# Patient Record
Sex: FEMALE | Employment: FULL TIME | URBAN - METROPOLITAN AREA
[De-identification: names, ages, dates, MRNs, and addresses within clinical notes are randomized per-mention and may not be internally consistent; named-entity substitution may affect disease eponyms.]

---

## 2017-02-28 ENCOUNTER — OFFICE VISIT (OUTPATIENT)
Dept: OBGYN | Facility: CLINIC | Age: 67
End: 2017-02-28
Payer: COMMERCIAL

## 2017-02-28 VITALS
DIASTOLIC BLOOD PRESSURE: 66 MMHG | BODY MASS INDEX: 21.35 KG/M2 | HEIGHT: 67 IN | WEIGHT: 136 LBS | SYSTOLIC BLOOD PRESSURE: 102 MMHG

## 2017-02-28 DIAGNOSIS — M81.0 MENOPAUSAL OSTEOPOROSIS: ICD-10-CM

## 2017-02-28 DIAGNOSIS — Z01.419 ENCOUNTER FOR GYNECOLOGICAL EXAMINATION WITHOUT ABNORMAL FINDING: Primary | ICD-10-CM

## 2017-02-28 PROCEDURE — G0101 CA SCREEN;PELVIC/BREAST EXAM: HCPCS | Performed by: OBSTETRICS & GYNECOLOGY

## 2017-02-28 ASSESSMENT — ANXIETY QUESTIONNAIRES
1. FEELING NERVOUS, ANXIOUS, OR ON EDGE: NOT AT ALL
IF YOU CHECKED OFF ANY PROBLEMS ON THIS QUESTIONNAIRE, HOW DIFFICULT HAVE THESE PROBLEMS MADE IT FOR YOU TO DO YOUR WORK, TAKE CARE OF THINGS AT HOME, OR GET ALONG WITH OTHER PEOPLE: NOT DIFFICULT AT ALL
6. BECOMING EASILY ANNOYED OR IRRITABLE: NOT AT ALL
7. FEELING AFRAID AS IF SOMETHING AWFUL MIGHT HAPPEN: NOT AT ALL
5. BEING SO RESTLESS THAT IT IS HARD TO SIT STILL: NOT AT ALL
2. NOT BEING ABLE TO STOP OR CONTROL WORRYING: NOT AT ALL
3. WORRYING TOO MUCH ABOUT DIFFERENT THINGS: NOT AT ALL
GAD7 TOTAL SCORE: 0

## 2017-02-28 ASSESSMENT — PATIENT HEALTH QUESTIONNAIRE - PHQ9: 5. POOR APPETITE OR OVEREATING: NOT AT ALL

## 2017-02-28 NOTE — PROGRESS NOTES
Candis is a 66 year old  female who presents for Medicare Limited exam.     Do you have a Health Care Directive?: No: Advance care planning was reviewed with patient; patient declined at this time.    Fall risk:   Fallen 2 or more times in the past year?: No  Any fall with injury in the past year?: No    HPI :  Andrae moved to [facility]. Seeing new MD at [facility].  No problems.   Bladder good.  On Fosamax drug holiday.    GYNECOLOGIC HISTORY:  No LMP recorded. Patient is postmenopausal..   reports that she has never smoked. She does not have any smokeless tobacco history on file.  STD testing offered?  Declined    Last PHQ-9 score on record=   PHQ-9 SCORE 2017   Total Score 0     Last GAD7 score on record=   ANDREW-7 SCORE 2016   Total Score 0 0       HEALTH MAINTENANCE:  Cholesterol: followed by primary care provider  Last Mammo: 17 at [facility], Result: normal, Next Mammo: in 1 year   Pap: N/A, over 65  DEXA: 4/20/15  Osteopenia, Spine -1.3, Hip -1.3 due this year  Colonoscopy:  , Result:  Normal, repeat 5 years due to family history, Next Colonoscopy:     HISTORY:  Obstetric History       T3      TAB0   SAB0   E0   M0   L3       # Outcome Date GA Lbr Pascual/2nd Weight Sex Delivery Anes PTL Lv   3 Term            2 Term            1 Term                 Past Medical History   Diagnosis Date     Basal cell carcinoma of skin      History of hormone replacement therapy      stopped      Menopausal osteoporosis 2015     Fosamax Holiday started      Stress incontinence, female      Urinary tract infection      Had 3 visits for UTI in . Last culture Pos for Proteus.     Past Surgical History   Procedure Laterality Date     No history of surgery       Family History   Problem Relation Age of Onset     Colon Cancer Father 60     OSTEOPOROSIS Sister      Social History     Social History     Marital status:      Spouse name: N/A      "Number of children: 3     Years of education: N/A     Social History Main Topics     Smoking status: Never Smoker     Smokeless tobacco: Not on file     Alcohol use 0.0 oz/week     0 Standard drinks or equivalent per week     Drug use: Not on file     Sexual activity: Yes     Partners: Male     Birth control/ protection: Post-menopausal     Other Topics Concern     Not on file     Social History Narrative    [facility]     Current Outpatient Prescriptions   Medication Sig     valACYclovir (VALTREX) 1000 mg tablet as needed      CALCIUM PO      GLUCOSAMINE-CHONDROITIN PO      MULTIPLE VITAMIN PO      No current facility-administered medications for this visit.      No Known Allergies    Past medical, surgical, social and family history were reviewed and updated in EPIC.    EXAM:  /66  Ht 5' 7.25\" (1.708 m)  Wt 136 lb (61.7 kg)  BMI 21.14 kg/m2   BMI: Body mass index is 21.14 kg/(m^2).    Constitutional: Appearance: Well nourished, well developed alert, in no acute distress  Breasts: Inspection of Breasts:  No lymphadenopathy present    Palpation of Breasts and Axillae:  No masses present on palpation, no  breast tenderness    Axillary Lymph Nodes:  No lymphadenopathy present  Neurologic/Psychiatric:    Mental Status:  Oriented X3     Pelvic Exam:  External Genitalia:     Normal appearance for age, no discharge present, no tenderness present, no inflammatory lesions present, color normal  Vagina:     Normal vaginal vault without central or paravaginal defects, ATROPHIC  Bladder:     Nontender to palpation  Urethra:   Urethral Body:  Urethra palpation normal, urethra structural support normal   Urethral Meatus:  No erythema or lesions present  Cervix:     Appearance healthy, no lesions present, nontender to palpation, no bleeding present  Uterus:     Nontender to palpation, no masses present, position anteflexed, mobility: normal  Adnexa:     No adnexal tenderness present, no adnexal masses " present  Perineum:     Perineum within normal limits, no evidence of trauma, no rashes or skin lesions present  Inguinal Lymph Nodes:     No lymphadenopathy present      Body mass index is 21.14 kg/(m^2).     reports that she has never smoked. She does not have any smokeless tobacco history on file.      ASSESSMENT:  66 year old female with satisfactory annual exam.    ICD-10-CM    1. Encounter for gynecological examination without abnormal finding Z01.419 Medicare Limited Visit   2. Menopausal osteoporosis- Fosamax Drug Holiday M81.0 DX Hip/Pelvis/Spine       COUNSELING:   Reviewed preventive health counseling, as reflected in patient instructions       Regular exercise    PLAN/PATIENT INSTRUCTIONS:  DEXA today.    Justin Kyle MD

## 2017-02-28 NOTE — MR AVS SNAPSHOT
"              After Visit Summary   2/28/2017    Candis Lino    MRN: [id number]           Patient Information     Date Of Birth          1950        Visit Information        Provider Department      2/28/2017 8:30 AM Justin Kyle MD [facility]        Today's Diagnoses     Encounter for gynecological examination without abnormal finding    -  1    Menopausal osteoporosis- Fosamax Drug Holiday           Follow-ups after your visit        Future tests that were ordered for you today     Open Future Orders        Priority Expected Expires Ordered    DX Hip/Pelvis/Spine Routine  2/28/2018 2/28/2017            Who to contact     If you have questions or need follow up information about today's clinic visit or your schedule please contact [facility] directly at [phone].  Normal or non-critical lab and imaging results will be communicated to you by MyChart, letter or phone within 4 business days after the clinic has received the results. If you do not hear from us within 7 days, please contact the clinic through MyChart or phone. If you have a critical or abnormal lab result, we will notify you by phone as soon as possible.  Submit refill requests through R[employer] or call your pharmacy and they will forward the refill request to us. Please allow 3 business days for your refill to be completed.          Additional Information About Your Visit        MyChart Information     R[employer] lets you send messages to your doctor, view your test results, renew your prescriptions, schedule appointments and more. To sign up, go to [email or web address] . Click on \"Log in\" on the left side of the screen, which will take you to the Welcome page. Then click on \"Sign up Now\" on the right side of the page.     You will be asked to enter the access code listed below, as well as some personal information. Please follow the directions to create your username and password.   " "  Your access code is: [email or web address]  Expires: 2017  8:51 AM     Your access code will  in 90 days. If you need help or a new code, please call your [facility] or [phone].        Care EveryWhere ID     This is your Care EveryWhere ID. This could be used by other organizations to access your [place] medical records  [id number]        Your Vitals Were     Height BMI (Body Mass Index)                5' 7.25\" (1.708 m) 21.14 kg/m2           Blood Pressure from Last 3 Encounters:   17 102/66   16 100/70   02/13/15 112/62    Weight from Last 3 Encounters:   17 136 lb (61.7 kg)   16 134 lb (60.8 kg)   02/13/15 137 lb (62.1 kg)              We Performed the Following     Medicare Limited Visit        Primary Care Provider Office Phone # Fax #    [facility] [phone]       [address] [place] [address]        Thank you!     Thank you for choosing [facility]  for your care. Our goal is always to provide you with excellent care. Hearing back from our patients is one way we can continue to improve our services. Please take a few minutes to complete the written survey that you may receive in the mail after your visit with us. Thank you!             Your Updated Medication List - Protect others around you: Learn how to safely use, store and throw away your medicines at www.disposemymeds.org.          This list is accurate as of: 17  8:58 AM.  Always use your most recent med list.                   Brand Name Dispense Instructions for use    CALCIUM PO          GLUCOSAMINE-CHONDROITIN PO          MULTIPLE VITAMIN PO          valACYclovir 1000 mg tablet    VALTREX    21 tablet    as needed         "

## 2017-03-01 ASSESSMENT — PATIENT HEALTH QUESTIONNAIRE - PHQ9: SUM OF ALL RESPONSES TO PHQ QUESTIONS 1-9: 0

## 2017-03-01 ASSESSMENT — ANXIETY QUESTIONNAIRES: GAD7 TOTAL SCORE: 0

## 2018-02-16 ENCOUNTER — TRANSFERRED RECORDS (OUTPATIENT)
Dept: HEALTH INFORMATION MANAGEMENT | Facility: CLINIC | Age: 68
End: 2018-02-16

## 2018-03-08 ENCOUNTER — OFFICE VISIT (OUTPATIENT)
Dept: OBGYN | Facility: CLINIC | Age: 68
End: 2018-03-08
Payer: COMMERCIAL

## 2018-03-08 ENCOUNTER — RADIANT APPOINTMENT (OUTPATIENT)
Dept: BONE DENSITY | Facility: CLINIC | Age: 68
End: 2018-03-08
Payer: COMMERCIAL

## 2018-03-08 VITALS
SYSTOLIC BLOOD PRESSURE: 106 MMHG | DIASTOLIC BLOOD PRESSURE: 58 MMHG | WEIGHT: 138 LBS | HEART RATE: 64 BPM | HEIGHT: 67 IN | BODY MASS INDEX: 21.66 KG/M2

## 2018-03-08 DIAGNOSIS — M81.0 MENOPAUSAL OSTEOPOROSIS: ICD-10-CM

## 2018-03-08 DIAGNOSIS — Z01.419 ENCOUNTER FOR GYNECOLOGICAL EXAMINATION WITHOUT ABNORMAL FINDING: Primary | ICD-10-CM

## 2018-03-08 DIAGNOSIS — Z78.0 ASYMPTOMATIC POSTMENOPAUSAL STATE: ICD-10-CM

## 2018-03-08 PROCEDURE — 77080 DXA BONE DENSITY AXIAL: CPT | Performed by: OBSTETRICS & GYNECOLOGY

## 2018-03-08 PROCEDURE — G0101 CA SCREEN;PELVIC/BREAST EXAM: HCPCS | Performed by: OBSTETRICS & GYNECOLOGY

## 2018-03-08 ASSESSMENT — PATIENT HEALTH QUESTIONNAIRE - PHQ9: 5. POOR APPETITE OR OVEREATING: NOT AT ALL

## 2018-03-08 ASSESSMENT — ANXIETY QUESTIONNAIRES
6. BECOMING EASILY ANNOYED OR IRRITABLE: NOT AT ALL
1. FEELING NERVOUS, ANXIOUS, OR ON EDGE: NOT AT ALL
GAD7 TOTAL SCORE: 0
2. NOT BEING ABLE TO STOP OR CONTROL WORRYING: NOT AT ALL
5. BEING SO RESTLESS THAT IT IS HARD TO SIT STILL: NOT AT ALL
7. FEELING AFRAID AS IF SOMETHING AWFUL MIGHT HAPPEN: NOT AT ALL
IF YOU CHECKED OFF ANY PROBLEMS ON THIS QUESTIONNAIRE, HOW DIFFICULT HAVE THESE PROBLEMS MADE IT FOR YOU TO DO YOUR WORK, TAKE CARE OF THINGS AT HOME, OR GET ALONG WITH OTHER PEOPLE: NOT DIFFICULT AT ALL
3. WORRYING TOO MUCH ABOUT DIFFERENT THINGS: NOT AT ALL

## 2018-03-08 NOTE — MR AVS SNAPSHOT
"              After Visit Summary   3/8/2018    Candis Lino    MRN: [id number]           Patient Information     Date Of Birth          1950        Visit Information        Provider Department      3/8/2018 8:00 AM Justin Kyle MD [facility]        Today's Diagnoses     Encounter for gynecological examination without abnormal finding    -  1    Menopausal osteoporosis           Follow-ups after your visit        Who to contact     If you have questions or need follow up information about today's clinic visit or your schedule please contact [facility] directly at [phone].  Normal or non-critical lab and imaging results will be communicated to you by N12 Technologieshart, letter or phone within 4 business days after the clinic has received the results. If you do not hear from us within 7 days, please contact the clinic through [employer] or phone. If you have a critical or abnormal lab result, we will notify you by phone as soon as possible.  Submit refill requests through QR[employer] or call your pharmacy and they will forward the refill request to us. Please allow 3 business days for your refill to be completed.          Additional Information About Your Visit        MyChart Information     QRuso lets you send messages to your doctor, view your test results, renew your prescriptions, schedule appointments and more. To sign up, go to [email or web address] . Click on \"Log in\" on the left side of the screen, which will take you to the Welcome page. Then click on \"Sign up Now\" on the right side of the page.     You will be asked to enter the access code listed below, as well as some personal information. Please follow the directions to create your username and password.     Your access code is: [email or web address]  Expires: 2018  8:30 AM     Your access code will  in 90 days. If you need help or a new code, please call your [facility] or [phone].      " "  Care EveryWhere ID     This is your Care EveryWhere ID. This could be used by other organizations to access your [place] medical records  [id number]        Your Vitals Were     Pulse Height Breastfeeding? BMI (Body Mass Index)          64 5' 7.25\" (1.708 m) No 21.45 kg/m2         Blood Pressure from Last 3 Encounters:   03/08/18 106/58   02/28/17 102/66   02/18/16 100/70    Weight from Last 3 Encounters:   03/08/18 138 lb (62.6 kg)   02/28/17 136 lb (61.7 kg)   02/18/16 134 lb (60.8 kg)              We Performed the Following     Medicare Limited Visit        Primary Care Provider Office Phone # Fax #    [facility] [phone]       [address] [place] [address]        Equal Access to Services     JESSY SARAVIA : Hadii brenda ghosh hadasho Soomaali, waaxda luqadaha, qaybta kaalmada adeegyada, waldo jiménez hayshiva guerra . So [facility] [phone].    ATENCIÓN: Si habla español, tiene a dillon disposición servicios gratuitos de asistencia lingüística. Rahul al [phone].    We comply with applicable federal civil rights laws and [place] laws. We do not discriminate on the basis of race, color, national origin, age, disability, sex, sexual orientation, or gender identity.            Thank you!     Thank you for choosing [facility]  for your care. Our goal is always to provide you with excellent care. Hearing back from our patients is one way we can continue to improve our services. Please take a few minutes to complete the written survey that you may receive in the mail after your visit with us. Thank you!             Your Updated Medication List - Protect others around you: Learn how to safely use, store and throw away your medicines at www.disposemymeds.org.          This list is accurate as of 3/8/18  8:30 AM.  Always use your most recent med list.                   Brand Name Dispense Instructions for use Diagnosis    CALCIUM PO           " GLUCOSAMINE-CHONDROITIN PO           MULTIPLE VITAMIN PO           valACYclovir 1000 mg tablet    VALTREX    21 tablet    as needed

## 2018-03-08 NOTE — Clinical Note
Please abstract the following data from this visit with this patient into the appropriate field in Epic:  Mammogram done on this date: 2/16/2018 (approximately), by this group: [facility], results were Normal.

## 2018-03-08 NOTE — PROGRESS NOTES
Candis is a 67 year old  female who presents for Medicare Limited exam.     Do you have a Health Care Directive?: No: Advance care planning was reviewed with patient; patient declined at this time.    Fall risk:   Fallen 2 or more times in the past year?: No  Any fall with injury in the past year?: No    HPI :  Doing well. Active. Cross country skiing.  Bladder good.  DEXA today. Last one with osteopenia. On Fosamax drug holiday.  UTD on vaccinations.     GYNECOLOGIC HISTORY:  No LMP recorded. Patient is postmenopausal..   reports that she has never smoked. She has never used smokeless tobacco.    STD testing offered?  Declined  Last PHQ-9 score on record=   PHQ-9 SCORE 3/8/2018   Total Score 0     Last GAD7 score on record=   ANDREW-7 SCORE 2016 2017 3/8/2018   Total Score 0 0 0       HEALTH MAINTENANCE:  Cholesterol: about 2 weeks with PCP  Last Mammo: 2 weeks ago with PCP, Result: normal, Next Mammo: 1 year   Pap: 2013 WNL HPV-  DEXA:  2015 Hip -0.9, Spine -1.3  Colonoscopy:  , Result:  normal, Next Colonoscopy: due this year.    HISTORY:  Obstetric History       T3      L3     SAB0   TAB0   Ectopic0   Multiple0   Live Births0       # Outcome Date GA Lbr Pascual/2nd Weight Sex Delivery Anes PTL Lv   3 Term            2 Term            1 Term                 Past Medical History:   Diagnosis Date     Basal cell carcinoma of skin      History of hormone replacement therapy     stopped      Menopausal osteoporosis 2015    Fosamax Holiday started      Stress incontinence, female      Urinary tract infection     Had 3 visits for UTI in . Last culture Pos for Proteus.     Past Surgical History:   Procedure Laterality Date     NO HISTORY OF SURGERY       Family History   Problem Relation Age of Onset     Colon Cancer Father 60     OSTEOPOROSIS Sister      Social History     Social History     Marital status:      Spouse name: N/A     Number of children:  "3     Years of education: N/A     Social History Main Topics     Smoking status: Never Smoker     Smokeless tobacco: Not on file     Alcohol use 0.0 oz/week     0 Standard drinks or equivalent per week     Drug use: Not on file     Sexual activity: Yes     Partners: Male     Birth control/ protection: Post-menopausal     Other Topics Concern     Not on file     Social History Narrative    [facility]     Current Outpatient Prescriptions   Medication Sig     valACYclovir (VALTREX) 1000 mg tablet as needed      CALCIUM PO      GLUCOSAMINE-CHONDROITIN PO      MULTIPLE VITAMIN PO      No current facility-administered medications for this visit.      No Known Allergies    Past medical, surgical, social and family history were reviewed and updated in [facility].    EXAM:  /58  Pulse 64  Ht 5' 7.25\" (1.708 m)  Wt 138 lb (62.6 kg)  Breastfeeding? No  BMI 21.45 kg/m2   BMI: Body mass index is 21.45 kg/(m^2).    Constitutional: Appearance: Well nourished, well developed alert, in no acute distress  Breasts: Inspection of Breasts:  No lymphadenopathy present    Palpation of Breasts and Axillae:  No masses present on palpation, no  breast tenderness    Axillary Lymph Nodes:  No lymphadenopathy present  Neurologic/Psychiatric:    Mental Status:  Oriented X3     Pelvic Exam:  External Genitalia:     Normal appearance for age, no discharge present, no tenderness present, no inflammatory lesions present, color normal  Vagina:     Normal vaginal vault without central or paravaginal defects, no discharge present, no inflammatory lesions present, no masses present  Bladder:     Nontender to palpation  Urethra:   Urethral Body:  Urethra palpation normal, urethra structural support normal   Urethral Meatus:  No erythema or lesions present  Cervix:     Appearance healthy, no lesions present, nontender to palpation, no bleeding present  Uterus:     Uterus: firm, normal sized and nontender, anteverted in position. "   Adnexa:     No adnexal tenderness present, no adnexal masses present  Perineum:     Perineum within normal limits, no evidence of trauma, no rashes or skin lesions present  Anus:     Anus within normal limits, no hemorrhoids present  Inguinal Lymph Nodes:     No lymphadenopathy present  Pubic Hair:     Normal pubic hair distribution for age  Genitalia and Groin:     No rashes present, no lesions present, no areas of discoloration, no masses present      Body mass index is 21.45 kg/(m^2).  Weight management plan noted, stable and monitoring   reports that she has never smoked. She has never used smokeless tobacco.      ASSESSMENT:  67 year old female with satisfactory annual exam.    ICD-10-CM    1. Encounter for gynecological examination without abnormal finding Z01.419 Medicare Limited Visit   2. Menopausal osteoporosis M81.0        COUNSELING:   Reviewed preventive health counseling, as reflected in patient instructions       Regular exercise    PLAN/PATIENT INSTRUCTIONS:  DEXA today. If stable repeat in 3-4 years.  Discussed new Shingles vaccine.   Colonoscopy this year.    Justin Kyle MD

## 2018-03-08 NOTE — PROGRESS NOTES
No major changes in results. 2 vertebrae might be worse but the test is on a different machine so direct comparison can't be made. Plan to repeat in 2 years.

## 2018-03-09 ASSESSMENT — PATIENT HEALTH QUESTIONNAIRE - PHQ9: SUM OF ALL RESPONSES TO PHQ QUESTIONS 1-9: 0

## 2018-03-09 ASSESSMENT — ANXIETY QUESTIONNAIRES: GAD7 TOTAL SCORE: 0

## 2018-05-16 ENCOUNTER — OFFICE VISIT (OUTPATIENT)
Dept: OBGYN | Facility: CLINIC | Age: 68
End: 2018-05-16
Payer: COMMERCIAL

## 2018-05-16 VITALS
SYSTOLIC BLOOD PRESSURE: 114 MMHG | BODY MASS INDEX: 21.72 KG/M2 | WEIGHT: 138.4 LBS | DIASTOLIC BLOOD PRESSURE: 76 MMHG | HEIGHT: 67 IN | TEMPERATURE: 97.6 F

## 2018-05-16 DIAGNOSIS — R39.9 UTI SYMPTOMS: Primary | ICD-10-CM

## 2018-05-16 LAB
ALBUMIN UR-MCNC: NEGATIVE MG/DL
APPEARANCE UR: ABNORMAL
BILIRUB UR QL STRIP: NEGATIVE
COLOR UR AUTO: YELLOW
GLUCOSE UR STRIP-MCNC: NEGATIVE MG/DL
HGB UR QL STRIP: ABNORMAL
KETONES UR STRIP-MCNC: NEGATIVE MG/DL
LEUKOCYTE ESTERASE UR QL STRIP: ABNORMAL
NITRATE UR QL: NEGATIVE
PH UR STRIP: 5.5 PH (ref 5–7)
SOURCE: ABNORMAL
SP GR UR STRIP: <=1.005 (ref 1–1.03)
UROBILINOGEN UR STRIP-ACNC: 0.2 EU/DL (ref 0.2–1)

## 2018-05-16 PROCEDURE — 87186 SC STD MICRODIL/AGAR DIL: CPT | Performed by: NURSE PRACTITIONER

## 2018-05-16 PROCEDURE — 87086 URINE CULTURE/COLONY COUNT: CPT | Performed by: NURSE PRACTITIONER

## 2018-05-16 PROCEDURE — 87088 URINE BACTERIA CULTURE: CPT | Performed by: NURSE PRACTITIONER

## 2018-05-16 PROCEDURE — 99213 OFFICE O/P EST LOW 20 MIN: CPT | Performed by: NURSE PRACTITIONER

## 2018-05-16 PROCEDURE — 81003 URINALYSIS AUTO W/O SCOPE: CPT | Performed by: NURSE PRACTITIONER

## 2018-05-16 RX ORDER — CIPROFLOXACIN 500 MG/1
500 TABLET, FILM COATED ORAL 2 TIMES DAILY
Qty: 14 TABLET | Refills: 0 | Status: SHIPPED | OUTPATIENT
Start: 2018-05-16 | End: 2019-03-14

## 2018-05-16 NOTE — MR AVS SNAPSHOT
"              After Visit Summary   2018    Candis Lino    MRN: [id number]           Patient Information     Date Of Birth          1950        Visit Information        Provider Department      2018 1:00 PM Jacquelyn Carcamo APRN CNP [facility]        Today's Diagnoses     UTI symptoms    -  1       Follow-ups after your visit        Who to contact     If you have questions or need follow up information about today's clinic visit or your schedule please contact [facility] directly at [phone].  Normal or non-critical lab and imaging results will be communicated to you by Baby[employer], letter or phone within 4 business days after the clinic has received the results. If you do not hear from us within 7 days, please contact the clinic through [employer] or phone. If you have a critical or abnormal lab result, we will notify you by phone as soon as possible.  Submit refill requests through [employer] or call your pharmacy and they will forward the refill request to us. Please allow 3 business days for your refill to be completed.          Additional Information About Your Visit        MyChart Information     [employer] lets you send messages to your doctor, view your test results, renew your prescriptions, schedule appointments and more. To sign up, go to [email or web address] . Click on \"Log in\" on the left side of the screen, which will take you to the Welcome page. Then click on \"Sign up Now\" on the right side of the page.     You will be asked to enter the access code listed below, as well as some personal information. Please follow the directions to create your username and password.     Your access code is: [email or web address]  Expires: 2018  9:30 AM     Your access code will  in 90 days. If you need help or a new code, please call your [place] clinic or [phone].        Care EveryWhere ID     This is your Care EveryWhere ID. This could be used by " "other organizations to access your [place] medical records  [id number]        Your Vitals Were     Temperature Height BMI (Body Mass Index)             97.6  F (36.4  C) 5' 7.25\" (1.708 m) 21.52 kg/m2          Blood Pressure from Last 3 Encounters:   05/16/18 114/76   03/08/18 106/58   02/28/17 102/66    Weight from Last 3 Encounters:   05/16/18 138 lb 6.4 oz (62.8 kg)   03/08/18 138 lb (62.6 kg)   02/28/17 136 lb (61.7 kg)              We Performed the Following     UA without Microscopic     Urine Culture Aerobic Bacterial          Today's Medication Changes          These changes are accurate as of 5/16/18  1:29 PM.  If you have any questions, ask your nurse or doctor.               Start taking these medicines.        Dose/Directions    ciprofloxacin 500 MG tablet   Commonly known as:  CIPRO   Used for:  UTI symptoms        Dose:  500 mg   Take 1 tablet (500 mg) by mouth 2 times daily   Quantity:  14 tablet   Refills:  0            Where to get your medicines      These medications were sent to [facility] [address] [place] [address] AT [facility] OF  & [address] [address]     Phone:  [phone]     ciprofloxacin 500 MG tablet                Primary Care Provider Office Phone # Fax #    [facility] [phone]       [address] [place] [address]        Equal Access to Services     JESSY SARAVIA AH: Hadii brenda arriolao Sokeiko, waaxda luqadaha, qaybta kaalmada nasreenyada, wax tino vanegas. So [facility] [phone].    ATENCIÓN: Si habla español, tiene a dillon disposición servicios gratuitos de asistencia lingüística. Llame al [phone].    We comply with applicable federal civil rights laws and [place] laws. We do not discriminate on the basis of race, color, national origin, age, disability, sex, sexual orientation, or gender identity.            Thank you!     Thank you for " choosing [facility]  for your care. Our goal is always to provide you with excellent care. Hearing back from our patients is one way we can continue to improve our services. Please take a few minutes to complete the written survey that you may receive in the mail after your visit with us. Thank you!             Your Updated Medication List - Protect others around you: Learn how to safely use, store and throw away your medicines at www.disposemymeds.org.          This list is accurate as of 5/16/18  1:29 PM.  Always use your most recent med list.                   Brand Name Dispense Instructions for use Diagnosis    CALCIUM PO           ciprofloxacin 500 MG tablet    CIPRO    14 tablet    Take 1 tablet (500 mg) by mouth 2 times daily    UTI symptoms       GLUCOSAMINE-CHONDROITIN PO           MULTIPLE VITAMIN PO           valACYclovir 1000 mg tablet    VALTREX    21 tablet    as needed

## 2018-05-16 NOTE — PROGRESS NOTES
SUBJECTIVE:                                                   Candis Lino is a 68 year old female who presents to clinic today for the following health issue(s):  Patient presents with:  UTI: has urgency and cloudy urine, pain at end of stream.      Additional information: was treated at urgent care on 18 with cephalexin 5 days.    HPI:  Pt here today with ongoing symptoms of a UTI. She was treated with keflex for 5 days.     Denies back pain, pelvic pressure. She does have some burning at the end of the stream.     No fever/chills.    She does work out daily and wears a pad for urinary leakage. She tries to change the pads when they are soiled.    No LMP recorded. Patient is postmenopausal..   Patient is sexually active, .  Using menopause for contraception.    reports that she has never smoked. She has never used smokeless tobacco.    STD testing offered?  Declined    Health maintenance updated:  yes    Today's PHQ-2 Score:   PHQ-2 (  [employer]) 2018   Q1: Little interest or pleasure in doing things 0   Q2: Feeling down, depressed or hopeless 0   PHQ-2 Score 0     Today's PHQ-9 Score:   PHQ-9 SCORE 3/8/2018   Total Score 0     Today's ANDREW-7 Score:   ANDREW-7 SCORE 3/8/2018   Total Score 0       Problem list and histories reviewed & adjusted, as indicated.  Additional history: as documented.    Patient Active Problem List   Diagnosis     Menopausal osteoporosis     Past Surgical History:   Procedure Laterality Date     NO HISTORY OF SURGERY        Social History   Substance Use Topics     Smoking status: Never Smoker     Smokeless tobacco: Never Used     Alcohol use 0.0 oz/week     0 Standard drinks or equivalent per week      Problem (# of Occurrences) Relation (Name,Age of Onset)    Colon Cancer (1) Father (60)    OSTEOPOROSIS (1) Sister            Current Outpatient Prescriptions   Medication Sig     ciprofloxacin (CIPRO) 500 MG tablet Take 1 tablet (500 mg) by mouth 2 times daily      "CALCIUM PO      GLUCOSAMINE-CHONDROITIN PO      MULTIPLE VITAMIN PO      valACYclovir (VALTREX) 1000 mg tablet as needed      No current facility-administered medications for this visit.      No Known Allergies    ROS:  12 point review of systems negative other than symptoms noted below.  Genitourinary: Urgency    OBJECTIVE:     /76  Temp 97.6  F (36.4  C)  Ht 5' 7.25\" (1.708 m)  Wt 138 lb 6.4 oz (62.8 kg)  BMI 21.52 kg/m2  Body mass index is 21.52 kg/(m^2).    Exam:  Constitutional:  Appearance: Well nourished, well developed alert, in no acute distress  Neurologic/Psychiatric:  Mental Status:  Oriented X3   No Pelvic Exam performed     In-Clinic Test Results:  Results for orders placed or performed in visit on 05/16/18 (from the past 24 hour(s))   UA without Microscopic   Result Value Ref Range    Color Urine Yellow     Appearance Urine Cloudy     Glucose Urine Negative NEG^Negative mg/dL    Bilirubin Urine Negative NEG^Negative    Ketones Urine Negative NEG^Negative mg/dL    Specific Gravity Urine <=1.005 1.003 - 1.035    Blood Urine 3+ (A) NEG^Negative    pH Urine 5.5 5.0 - 7.0 pH    Protein Albumin Urine Negative NEG^Negative mg/dL    Urobilinogen Urine 0.2 0.2 - 1.0 EU/dL    Nitrite Urine Negative NEG^Negative    Leukocyte Esterase Urine 3+ (A) NEG^Negative    Source Midstream Urine        ASSESSMENT/PLAN:                                                        ICD-10-CM    1. UTI symptoms R39.9 UA without Microscopic     ciprofloxacin (CIPRO) 500 MG tablet     Urine Culture Aerobic Bacterial       There are no Patient Instructions on file for this visit.    67 yo with peristent UTI symptoms. Her UA is actually worse since taking keflex. Will send UC. UC reviewed from urgent care. Will try cipro. Will update with UC on Friday.     EDVIN [facility]ancey [facility]  "

## 2018-05-18 ENCOUNTER — TELEPHONE (OUTPATIENT)
Dept: OBGYN | Facility: CLINIC | Age: 68
End: 2018-05-18

## 2018-05-18 LAB
BACTERIA SPEC CULT: ABNORMAL
BACTERIA SPEC CULT: ABNORMAL
Lab: ABNORMAL
SPECIMEN SOURCE: ABNORMAL

## 2018-08-27 ENCOUNTER — TELEPHONE (OUTPATIENT)
Dept: OBGYN | Facility: CLINIC | Age: 68
End: 2018-08-27

## 2018-08-27 NOTE — LETTER
[facility] FOR [address] [address]  [phone]        August 27, 2018    Candis Lino  [address] [address]              Dear Candis Lino    This is to remind you that your Urinalysis and Urine Culture is due.    You may call our office at [phone] to schedule an appointment.          Sincerely,        Jacquelyn Carcamo RN, CNP

## 2019-02-21 ENCOUNTER — TRANSFERRED RECORDS (OUTPATIENT)
Dept: HEALTH INFORMATION MANAGEMENT | Facility: CLINIC | Age: 69
End: 2019-02-21

## 2019-03-14 ENCOUNTER — OFFICE VISIT (OUTPATIENT)
Dept: OBGYN | Facility: CLINIC | Age: 69
End: 2019-03-14
Payer: COMMERCIAL

## 2019-03-14 VITALS
BODY MASS INDEX: 21.63 KG/M2 | DIASTOLIC BLOOD PRESSURE: 62 MMHG | WEIGHT: 137.8 LBS | HEART RATE: 60 BPM | HEIGHT: 67 IN | SYSTOLIC BLOOD PRESSURE: 100 MMHG

## 2019-03-14 DIAGNOSIS — M81.0 MENOPAUSAL OSTEOPOROSIS: ICD-10-CM

## 2019-03-14 DIAGNOSIS — N39.3 STRESS INCONTINENCE IN FEMALE: ICD-10-CM

## 2019-03-14 DIAGNOSIS — Z01.419 ENCOUNTER FOR GYNECOLOGICAL EXAMINATION WITHOUT ABNORMAL FINDING: Primary | ICD-10-CM

## 2019-03-14 PROCEDURE — 99213 OFFICE O/P EST LOW 20 MIN: CPT | Performed by: OBSTETRICS & GYNECOLOGY

## 2019-03-14 RX ORDER — ATORVASTATIN CALCIUM 40 MG/1
40 TABLET, FILM COATED ORAL
COMMUNITY
Start: 2019-02-18 | End: 2020-06-04

## 2019-03-14 ASSESSMENT — ANXIETY QUESTIONNAIRES
GAD7 TOTAL SCORE: 0
7. FEELING AFRAID AS IF SOMETHING AWFUL MIGHT HAPPEN: NOT AT ALL
3. WORRYING TOO MUCH ABOUT DIFFERENT THINGS: NOT AT ALL
6. BECOMING EASILY ANNOYED OR IRRITABLE: NOT AT ALL
2. NOT BEING ABLE TO STOP OR CONTROL WORRYING: NOT AT ALL
1. FEELING NERVOUS, ANXIOUS, OR ON EDGE: NOT AT ALL
5. BEING SO RESTLESS THAT IT IS HARD TO SIT STILL: NOT AT ALL

## 2019-03-14 ASSESSMENT — PATIENT HEALTH QUESTIONNAIRE - PHQ9
SUM OF ALL RESPONSES TO PHQ QUESTIONS 1-9: 0
5. POOR APPETITE OR OVEREATING: NOT AT ALL

## 2019-03-14 ASSESSMENT — MIFFLIN-ST. JEOR: SCORE: 1187.69

## 2019-03-14 NOTE — LETTER
3/14/2019        RE: Candis Lino  [address]   [place] [address]          Candis is a 68 year old  female who presents for Medicare Limited exam.     Do you have a Health Care Directive?: No: Advance care planning was reviewed with patient; patient declined at this time.    Fall risk:   Fall Risk Assessment completed per order.    HPI :  Having recurrent UTI symptoms. Awaiting recent UC with her PCP.  Exercises regularly. On Fosamax drug Holiday. Last years DEXA mild to moderate osteopenia.  Started Lipitor this year.  Still has USI    GYNECOLOGIC HISTORY:  No LMP recorded. Patient is postmenopausal..   reports that  has never smoked. she has never used smokeless tobacco.    STD testing offered?  Declined  Last PHQ-9 score on record=   PHQ-9 SCORE 3/14/2019   PHQ-9 Total Score 0     Last GAD7 score on record=   ANDREW-7 SCORE 2017 3/8/2018 3/14/2019   Total Score 0 0 0       HEALTH MAINTENANCE:  Cholesterol: (No results found for: CHOL labs done with pcp  Last Mammo: 19 Care Everywhere, Result: normal, Next Mammo: 2020   Pap: 13 neg, HPV-  DEXA:  3/8/18  Colonoscopy: 18 Care Everywhere, Result: normal, Next Colonoscopy: 5 years.    HISTORY:  Obstetric History       T3      L3     SAB0   TAB0   Ectopic0   Multiple0   Live Births0       # Outcome Date GA Lbr Pascual/2nd Weight Sex Delivery Anes PTL Lv   3 Term            2 Term            1 Term                 Past Medical History:   Diagnosis Date     Basal cell carcinoma of skin      History of hormone replacement therapy     stopped      Menopausal osteoporosis 2015    Fosamax Holiday started      Stress incontinence, female      Urinary tract infection     Had 3 visits for UTI in . Last culture Pos for Proteus.     Past Surgical History:   Procedure Laterality Date     NO HISTORY OF SURGERY       Family History   Problem Relation Age of Onset     Colon Cancer Father 60      Osteoporosis Sister      Social History     Socioeconomic History     Marital status:      Spouse name: Not on file     Number of children: 3     Years of education: Not on file     Highest education level: Not on file   Occupational History     Not on file   Social Needs     Financial resource strain: Not on file     Food insecurity:     Worry: Not on file     Inability: Not on file     Transportation needs:     Medical: Not on file     Non-medical: Not on file   Tobacco Use     Smoking status: Never Smoker     Smokeless tobacco: Never Used   Substance and Sexual Activity     Alcohol use: Yes     Alcohol/week: 0.0 oz     Drug use: Not on file     Sexual activity: Yes     Partners: Male     Birth control/protection: Post-menopausal   Lifestyle     Physical activity:     Days per week: Not on file     Minutes per session: Not on file     Stress: Not on file   Relationships     Social connections:     Talks on phone: Not on file     Gets together: Not on file     Attends Jainism service: Not on file     Active member of club or organization: Not on file     Attends meetings of clubs or organizations: Not on file     Relationship status: Not on file     Intimate partner violence:     Fear of current or ex partner: Not on file     Emotionally abused: Not on file     Physically abused: Not on file     Forced sexual activity: Not on file   Other Topics Concern     Parent/sibling w/ CABG, MI or angioplasty before 65F 55M? Not Asked   Social History Narrative    [employer]     Current Outpatient Medications   Medication Sig     atorvastatin (LIPITOR) 40 MG tablet Take 40 mg by mouth     CALCIUM PO      GLUCOSAMINE-CHONDROITIN PO      MULTIPLE VITAMIN PO      valACYclovir (VALTREX) 1000 mg tablet as needed      No current facility-administered medications for this visit.      No Known Allergies    Past medical, surgical, social and family history were reviewed and updated in EPIC.    EXAM:  BP  "100/62   Pulse 60   Ht 1.702 m (5' 7\")   Wt 62.5 kg (137 lb 12.8 oz)   BMI 21.58 kg/m      BMI: Body mass index is 21.58 kg/m .    Constitutional: Appearance: Well nourished, well developed alert, in no acute distress  Breasts: Inspection of Breasts:  No lymphadenopathy present    Palpation of Breasts and Axillae:  No masses present on palpation, no  breast tenderness    Axillary Lymph Nodes:  No lymphadenopathy present  Neurologic/Psychiatric:    Mental Status:  Oriented X3     Pelvic Exam:  External Genitalia:     Normal appearance for age, no discharge present, no tenderness present, no inflammatory lesions present, color normal  Vagina:     Normal vaginal vault without central or paravaginal defects, no discharge present, no inflammatory lesions present, no masses present  Bladder:     Nontender to palpation  Urethra:   Urethral Body:  Urethra palpation normal, urethra structural support normal   Urethral Meatus:  No erythema or lesions present  Cervix:     Appearance healthy, no lesions present, nontender to palpation, no bleeding present  Uterus:     Uterus: firm, normal sized and nontender, anteverted in position.   Adnexa:     No adnexal tenderness present, no adnexal masses present  Perineum:     Perineum within normal limits, no evidence of trauma, no rashes or skin lesions present  Anus:     Anus within normal limits, no hemorrhoids present  Inguinal Lymph Nodes:     No lymphadenopathy present  Pubic Hair:     Normal pubic hair distribution for age  Genitalia and Groin:     No rashes present, no lesions present, no areas of discoloration, no masses present      Body mass index is 21.58 kg/m .     reports that  has never smoked. she has never used smokeless tobacco.      ASSESSMENT:  68 year old female with satisfactory annual exam.    ICD-10-CM    1. Encounter for gynecological examination without abnormal finding Z01.419 Medicare Limited Visit   2. Menopausal osteoporosis M81.0    3. Stress " incontinence in female N39.3        COUNSELING:   Reviewed preventive health counseling, as reflected in patient instructions       Regular exercise    PLAN/PATIENT INSTRUCTIONS:  Discussed USI  Continue Drug holiday. DEXA in 2 years.  Discussed UTIs and their causes.  Gave info on TheraCran supplements    Justin Kyle MD              Sincerely,        Justin Kyle MD

## 2019-03-14 NOTE — NURSING NOTE
Please abstract the following data from this visit with this patient into the appropriate field in Epic:    Mammogram done on this date: 2/21/19 (approximately), by this group: [facility], results were WNL.       Please abstract the following data from this visit with this patient into the appropriate field in Epic:    Colonoscopy done on this date: 11/14/18 (approximately), by this group:   [facility], results were WNL. Repeat 5 years.

## 2019-03-14 NOTE — PROGRESS NOTES
Candis is a 68 year old  female who presents for Medicare Limited exam.     Do you have a Health Care Directive?: No: Advance care planning was reviewed with patient; patient declined at this time.    Fall risk:   Fall Risk Assessment completed per order.    HPI :  Having recurrent UTI symptoms. Awaiting recent UC with her PCP.  Exercises regularly. On Fosamax drug Holiday. Last years DEXA mild to moderate osteopenia.  Started Lipitor this year.  Still has USI    GYNECOLOGIC HISTORY:  No LMP recorded. Patient is postmenopausal..   reports that  has never smoked. she has never used smokeless tobacco.    STD testing offered?  Declined  Last PHQ-9 score on record=   PHQ-9 SCORE 3/14/2019   PHQ-9 Total Score 0     Last GAD7 score on record=   ANDREW-7 SCORE 2017 3/8/2018 3/14/2019   Total Score 0 0 0       HEALTH MAINTENANCE:  Cholesterol: (No results found for: CHOL labs done with pcp  Last Mammo: 19 Care Everywhere, Result: normal, Next Mammo: 2020   Pap: 13 neg, HPV-  DEXA:  3/8/18  Colonoscopy: 18 Care Everywhere, Result: normal, Next Colonoscopy: 5 years.    HISTORY:  Obstetric History       T3      L3     SAB0   TAB0   Ectopic0   Multiple0   Live Births0       # Outcome Date GA Lbr Pascual/2nd Weight Sex Delivery Anes PTL Lv   3 Term            2 Term            1 Term                 Past Medical History:   Diagnosis Date     Basal cell carcinoma of skin      History of hormone replacement therapy     stopped      Menopausal osteoporosis 2015    Fosamax Holiday started      Stress incontinence, female      Urinary tract infection     Had 3 visits for UTI in . Last culture Pos for Proteus.     Past Surgical History:   Procedure Laterality Date     NO HISTORY OF SURGERY       Family History   Problem Relation Age of Onset     Colon Cancer Father 60     Osteoporosis Sister      Social History     Socioeconomic History     Marital status:      Spouse  "name: Not on file     Number of children: 3     Years of education: Not on file     Highest education level: Not on file   Occupational History     Not on file   Social Needs     Financial resource strain: Not on file     Food insecurity:     Worry: Not on file     Inability: Not on file     Transportation needs:     Medical: Not on file     Non-medical: Not on file   Tobacco Use     Smoking status: Never Smoker     Smokeless tobacco: Never Used   Substance and Sexual Activity     Alcohol use: Yes     Alcohol/week: 0.0 oz     Drug use: Not on file     Sexual activity: Yes     Partners: Male     Birth control/protection: Post-menopausal   Lifestyle     Physical activity:     Days per week: Not on file     Minutes per session: Not on file     Stress: Not on file   Relationships     Social connections:     Talks on phone: Not on file     Gets together: Not on file     Attends Mormonism service: Not on file     Active member of club or organization: Not on file     Attends meetings of clubs or organizations: Not on file     Relationship status: Not on file     Intimate partner violence:     Fear of current or ex partner: Not on file     Emotionally abused: Not on file     Physically abused: Not on file     Forced sexual activity: Not on file   Other Topics Concern     Parent/sibling w/ CABG, MI or angioplasty before 65F 55M? Not Asked   Social History Narrative    [employer]     Current Outpatient Medications   Medication Sig     atorvastatin (LIPITOR) 40 MG tablet Take 40 mg by mouth     CALCIUM PO      GLUCOSAMINE-CHONDROITIN PO      MULTIPLE VITAMIN PO      valACYclovir (VALTREX) 1000 mg tablet as needed      No current facility-administered medications for this visit.      No Known Allergies    Past medical, surgical, social and family history were reviewed and updated in EPIC.    EXAM:  /62   Pulse 60   Ht 1.702 m (5' 7\")   Wt 62.5 kg (137 lb 12.8 oz)   BMI 21.58 kg/m     BMI: Body mass " index is 21.58 kg/m .    Constitutional: Appearance: Well nourished, well developed alert, in no acute distress  Breasts: Inspection of Breasts:  No lymphadenopathy present    Palpation of Breasts and Axillae:  No masses present on palpation, no  breast tenderness    Axillary Lymph Nodes:  No lymphadenopathy present  Neurologic/Psychiatric:    Mental Status:  Oriented X3     Pelvic Exam:  External Genitalia:     Normal appearance for age, no discharge present, no tenderness present, no inflammatory lesions present, color normal  Vagina:     Normal vaginal vault without central or paravaginal defects, no discharge present, no inflammatory lesions present, no masses present  Bladder:     Nontender to palpation  Urethra:   Urethral Body:  Urethra palpation normal, urethra structural support normal   Urethral Meatus:  No erythema or lesions present  Cervix:     Appearance healthy, no lesions present, nontender to palpation, no bleeding present  Uterus:     Uterus: firm, normal sized and nontender, anteverted in position.   Adnexa:     No adnexal tenderness present, no adnexal masses present  Perineum:     Perineum within normal limits, no evidence of trauma, no rashes or skin lesions present  Anus:     Anus within normal limits, no hemorrhoids present  Inguinal Lymph Nodes:     No lymphadenopathy present  Pubic Hair:     Normal pubic hair distribution for age  Genitalia and Groin:     No rashes present, no lesions present, no areas of discoloration, no masses present      Body mass index is 21.58 kg/m .     reports that  has never smoked. she has never used smokeless tobacco.      ASSESSMENT:  68 year old female with satisfactory annual exam.    ICD-10-CM    1. Encounter for gynecological examination without abnormal finding Z01.419 Medicare Limited Visit   2. Menopausal osteoporosis M81.0    3. Stress incontinence in female N39.3        COUNSELING:   Reviewed preventive health counseling, as reflected in patient  instructions       Regular exercise    PLAN/PATIENT INSTRUCTIONS:  Discussed USI  Continue Drug holiday. DEXA in 2 years.  Discussed UTIs and their causes.  Gave info on TheraCran supplements    Justin Kyle MD

## 2019-03-14 NOTE — Clinical Note
Please abstract the following data from this visit with this patient into the appropriate field in Epic:Mammogram done on this date: 2/21/19 (approximately), by this group: [facility] results were WNL. Please abstract the following data from this visit with this patient into the appropriate field in Epic:Colonoscopy done on this date: 11/14/18 (approximately), by this group: [facility], results were WNL. Repeat 5 years.

## 2019-03-15 ASSESSMENT — ANXIETY QUESTIONNAIRES: GAD7 TOTAL SCORE: 0

## 2020-02-07 ENCOUNTER — OFFICE VISIT (OUTPATIENT)
Dept: OBGYN | Facility: CLINIC | Age: 70
End: 2020-02-07
Payer: COMMERCIAL

## 2020-02-07 VITALS
BODY MASS INDEX: 22.32 KG/M2 | DIASTOLIC BLOOD PRESSURE: 64 MMHG | WEIGHT: 142.2 LBS | HEIGHT: 67 IN | SYSTOLIC BLOOD PRESSURE: 104 MMHG | HEART RATE: 58 BPM

## 2020-02-07 DIAGNOSIS — N39.0 RECURRENT UTI: ICD-10-CM

## 2020-02-07 DIAGNOSIS — N95.2 ATROPHIC VAGINITIS: ICD-10-CM

## 2020-02-07 DIAGNOSIS — N30.00 ACUTE CYSTITIS WITHOUT HEMATURIA: Primary | ICD-10-CM

## 2020-02-07 LAB
ALBUMIN UR-MCNC: NEGATIVE MG/DL
APPEARANCE UR: CLEAR
BACTERIA #/AREA URNS HPF: ABNORMAL /HPF
BILIRUB UR QL STRIP: NEGATIVE
COLOR UR AUTO: YELLOW
GLUCOSE UR STRIP-MCNC: NEGATIVE MG/DL
HGB UR QL STRIP: ABNORMAL
KETONES UR STRIP-MCNC: NEGATIVE MG/DL
LEUKOCYTE ESTERASE UR QL STRIP: ABNORMAL
NITRATE UR QL: NEGATIVE
PH UR STRIP: 7 PH (ref 5–7)
RBC #/AREA URNS AUTO: ABNORMAL /HPF
SOURCE: ABNORMAL
SP GR UR STRIP: 1.01 (ref 1–1.03)
UROBILINOGEN UR STRIP-ACNC: 0.2 EU/DL (ref 0.2–1)
WBC #/AREA URNS AUTO: ABNORMAL /HPF

## 2020-02-07 PROCEDURE — 87186 SC STD MICRODIL/AGAR DIL: CPT | Performed by: OBSTETRICS & GYNECOLOGY

## 2020-02-07 PROCEDURE — 81001 URINALYSIS AUTO W/SCOPE: CPT | Performed by: OBSTETRICS & GYNECOLOGY

## 2020-02-07 PROCEDURE — 99213 OFFICE O/P EST LOW 20 MIN: CPT | Performed by: OBSTETRICS & GYNECOLOGY

## 2020-02-07 PROCEDURE — 87088 URINE BACTERIA CULTURE: CPT | Performed by: OBSTETRICS & GYNECOLOGY

## 2020-02-07 PROCEDURE — 87086 URINE CULTURE/COLONY COUNT: CPT | Performed by: OBSTETRICS & GYNECOLOGY

## 2020-02-07 RX ORDER — ESTRADIOL 0.1 MG/G
1 CREAM VAGINAL
Qty: 42.5 G | Refills: 1 | Status: SHIPPED | OUTPATIENT
Start: 2020-02-10 | End: 2020-07-09

## 2020-02-07 RX ORDER — CIPROFLOXACIN 500 MG/1
500 TABLET, FILM COATED ORAL 2 TIMES DAILY
Qty: 14 TABLET | Refills: 0 | Status: SHIPPED | OUTPATIENT
Start: 2020-02-07 | End: 2020-02-18

## 2020-02-07 ASSESSMENT — MIFFLIN-ST. JEOR: SCORE: 1202.64

## 2020-02-07 NOTE — PROGRESS NOTES
"    SUBJECTIVE:                                                   Candis Lino is a 69 year old female who presents to clinic today for the following health issue(s):  Patient presents with:  Urinary Problem: c/o frequency, burning even when not urinating      HPI:  Having recurrent UTI symptoms. Has had UTIs almost every month. Positive cultures. Intermediate sensitive to macrobid.  Pt has not done any test of cure.   She is sexually active.       No LMP recorded. Patient is postmenopausal..     Patient is sexually active, .  Using menopause for contraception.    reports that she has never smoked. She has never used smokeless tobacco.    STD testing offered?  Declined    Health maintenance updated:  yes    Problem list and histories reviewed & adjusted, as indicated.  Additional history: as documented.    Patient Active Problem List   Diagnosis     Menopausal osteoporosis     Past Surgical History:   Procedure Laterality Date     NO HISTORY OF SURGERY        Social History     Tobacco Use     Smoking status: Never Smoker     Smokeless tobacco: Never Used   Substance Use Topics     Alcohol use: Yes     Alcohol/week: 0.0 standard drinks      Problem (# of Occurrences) Relation (Name,Age of Onset)    Colon Cancer (1) Father (60)    Osteoporosis (1) Sister            Current Outpatient Medications   Medication Sig     atorvastatin (LIPITOR) 40 MG tablet Take 40 mg by mouth     CALCIUM PO      GLUCOSAMINE-CHONDROITIN PO      MULTIPLE VITAMIN PO      valACYclovir (VALTREX) 1000 mg tablet as needed      No current facility-administered medications for this visit.      No Known Allergies    ROS:  12 point review of systems negative other than symptoms noted below or in the HPI.  Genitourinary: Frequency, Painful Urination and Urgency, burning even when not urinating.        OBJECTIVE:     /64   Pulse 58   Ht 1.702 m (5' 7\")   Wt 64.5 kg (142 lb 3.2 oz)   BMI 22.27 kg/m    Body mass index is 22.27 " kg/m .    Exam:  Constitutional:  Appearance: Well nourished, well developed alert, in no acute distress  Neurologic:  Mental Status:  Oriented X3.  Normal strength and tone, sensory exam grossly normal, mentation intact and speech normal.    Psychiatric:  Mentation appears normal and affect normal/bright.     In-Clinic Test Results:  Results for orders placed or performed in visit on 02/07/20 (from the past 24 hour(s))   UA with Microscopic   Result Value Ref Range    Color Urine Yellow     Appearance Urine Clear     Glucose Urine Negative NEG^Negative mg/dL    Bilirubin Urine Negative NEG^Negative    Ketones Urine Negative NEG^Negative mg/dL    Specific Gravity Urine 1.010 1.003 - 1.035    pH Urine 7.0 5.0 - 7.0 pH    Protein Albumin Urine Negative NEG^Negative mg/dL    Urobilinogen Urine 0.2 0.2 - 1.0 EU/dL    Nitrite Urine Negative NEG^Negative    Blood Urine Trace (A) NEG^Negative    Leukocyte Esterase Urine 1+ (A) NEG^Negative    Source Midstream Urine     WBC Urine 5-10 (A) OTO5^0 - 5 /HPF    RBC Urine O - 2 OTO2^O - 2 /HPF    Bacteria Urine Few (A) NEG^Negative /HPF       ASSESSMENT/PLAN:                                                        ICD-10-CM    1. Dysuria R30.0 UA with Microscopic       UA suspicious today. Will culture.   Rx cipro.  Most likely needs vaginal estrogen to help with local defenses.  Needs MARYAM after this Rx if positive culture.  Pt will use small amount on finger at introitus three times a week.      Justin Kyle MD  [facility]

## 2020-02-09 LAB
BACTERIA SPEC CULT: ABNORMAL
BACTERIA SPEC CULT: ABNORMAL
Lab: ABNORMAL
SPECIMEN SOURCE: ABNORMAL

## 2020-02-14 NOTE — PROGRESS NOTES
SUBJECTIVE:                                                   Candis Lino is a 69 year old female who presents to clinic today for the following health issue(s):  Patient presents with:  Follow Up        HPI:  Has had multiple UTIs. Recently had 50-100K Klebsiella UTI treated with Cipro.   Here today for MARYAM.  Started vaginal estrogen with fingertip. Only doing twice a week.    No LMP recorded. Patient is postmenopausal..     Patient is sexually active, .  Using menopause for contraception.    reports that she has never smoked. She has never used smokeless tobacco.    STD testing offered?  Declined    Health maintenance updated:  no, Due for mammogram    Today's PHQ-2 Score:   PHQ-2 (  [employer]) 2018   Q1: Little interest or pleasure in doing things 0   Q2: Feeling down, depressed or hopeless 0   PHQ-2 Score 0     Today's PHQ-9 Score:   PHQ-9 SCORE 3/14/2019   PHQ-9 Total Score 0     Today's ANDREW-7 Score:   ANDREW-7 SCORE 3/14/2019   Total Score 0       Problem list and histories reviewed & adjusted, as indicated.  Additional history: as documented.    Patient Active Problem List   Diagnosis     Menopausal osteoporosis     Past Surgical History:   Procedure Laterality Date     NO HISTORY OF SURGERY        Social History     Tobacco Use     Smoking status: Never Smoker     Smokeless tobacco: Never Used   Substance Use Topics     Alcohol use: Yes     Alcohol/week: 0.0 standard drinks      Problem (# of Occurrences) Relation (Name,Age of Onset)    Colon Cancer (1) Father (60)    Osteoporosis (1) Sister            Current Outpatient Medications   Medication Sig     atorvastatin (LIPITOR) 40 MG tablet Take 40 mg by mouth     CALCIUM PO      estradiol (ESTRACE) 0.1 MG/GM vaginal cream Place 1 g vaginally twice a week     GLUCOSAMINE-CHONDROITIN PO      MULTIPLE VITAMIN PO      valACYclovir (VALTREX) 1000 mg tablet as needed      No current facility-administered medications for this visit.      No Known  "Allergies    ROS:  12 point review of systems negative other than symptoms noted below or in the HPI.        OBJECTIVE:     /66 (BP Location: Right arm, Patient Position: Sitting, Cuff Size: Adult Regular)   Pulse 56   Ht 1.702 m (5' 7\")   Wt 64.9 kg (143 lb)   Breastfeeding No   BMI 22.40 kg/m    Body mass index is 22.4 kg/m .    Exam:  Constitutional:  Appearance: Well nourished, well developed alert, in no acute distress  Neurologic:  Mental Status:  Oriented X3.  Normal strength and tone, sensory exam grossly normal, mentation intact and speech normal.    Psychiatric:  Mentation appears normal and affect normal/bright.     In-Clinic Test Results:  Results for orders placed or performed in visit on 02/18/20 (from the past 24 hour(s))   UA with Microscopic   Result Value Ref Range    Color Urine Yellow     Appearance Urine Clear     Glucose Urine Negative NEG^Negative mg/dL    Bilirubin Urine Negative NEG^Negative    Ketones Urine Negative NEG^Negative mg/dL    Specific Gravity Urine 1.015 1.003 - 1.035    pH Urine 7.0 5.0 - 7.0 pH    Protein Albumin Urine Negative NEG^Negative mg/dL    Urobilinogen Urine 0.2 0.2 - 1.0 EU/dL    Nitrite Urine Negative NEG^Negative    Blood Urine Negative NEG^Negative    Leukocyte Esterase Urine Negative NEG^Negative    Source Midstream Urine     WBC Urine 0 - 5 OTO5^0 - 5 /HPF    RBC Urine O - 2 OTO2^O - 2 /HPF    Squamous Epithelial /LPF Urine Few FEW^Few /LPF       ASSESSMENT/PLAN:                                                        ICD-10-CM    1. Bladder infection N30.90 UA with Microscopic   2. Recurrent UTI N39.0 Urine Culture Aerobic Bacterial         UA today appears normal. Will culture to be sure.   Try vaginal estrogen nightly for a few weeks then twice a week.    Justin Kyle MD  [facility] [place]  "

## 2020-02-18 ENCOUNTER — OFFICE VISIT (OUTPATIENT)
Dept: OBGYN | Facility: CLINIC | Age: 70
End: 2020-02-18
Payer: COMMERCIAL

## 2020-02-18 VITALS
DIASTOLIC BLOOD PRESSURE: 66 MMHG | HEIGHT: 67 IN | HEART RATE: 56 BPM | SYSTOLIC BLOOD PRESSURE: 112 MMHG | BODY MASS INDEX: 22.44 KG/M2 | WEIGHT: 143 LBS

## 2020-02-18 DIAGNOSIS — N39.0 RECURRENT UTI: ICD-10-CM

## 2020-02-18 DIAGNOSIS — N30.90 BLADDER INFECTION: Primary | ICD-10-CM

## 2020-02-18 LAB
ALBUMIN UR-MCNC: NEGATIVE MG/DL
APPEARANCE UR: CLEAR
BILIRUB UR QL STRIP: NEGATIVE
COLOR UR AUTO: YELLOW
GLUCOSE UR STRIP-MCNC: NEGATIVE MG/DL
HGB UR QL STRIP: NEGATIVE
KETONES UR STRIP-MCNC: NEGATIVE MG/DL
LEUKOCYTE ESTERASE UR QL STRIP: NEGATIVE
NITRATE UR QL: NEGATIVE
NON-SQ EPI CELLS #/AREA URNS LPF: NORMAL /LPF
PH UR STRIP: 7 PH (ref 5–7)
RBC #/AREA URNS AUTO: NORMAL /HPF
SOURCE: NORMAL
SP GR UR STRIP: 1.01 (ref 1–1.03)
UROBILINOGEN UR STRIP-ACNC: 0.2 EU/DL (ref 0.2–1)
WBC #/AREA URNS AUTO: NORMAL /HPF

## 2020-02-18 PROCEDURE — 99213 OFFICE O/P EST LOW 20 MIN: CPT | Performed by: OBSTETRICS & GYNECOLOGY

## 2020-02-18 PROCEDURE — 87086 URINE CULTURE/COLONY COUNT: CPT | Performed by: OBSTETRICS & GYNECOLOGY

## 2020-02-18 PROCEDURE — 81001 URINALYSIS AUTO W/SCOPE: CPT | Performed by: OBSTETRICS & GYNECOLOGY

## 2020-02-18 ASSESSMENT — MIFFLIN-ST. JEOR: SCORE: 1206.27

## 2020-02-19 LAB
BACTERIA SPEC CULT: NORMAL
Lab: NORMAL
SPECIMEN SOURCE: NORMAL

## 2020-02-20 NOTE — RESULT ENCOUNTER NOTE
Normal UC. If gets another UTI it's a recurrence. Hopefully, the vaginal estrogen will prevent that from happening.

## 2020-04-20 ENCOUNTER — VIRTUAL VISIT (OUTPATIENT)
Dept: OBGYN | Facility: CLINIC | Age: 70
End: 2020-04-20
Payer: COMMERCIAL

## 2020-04-20 DIAGNOSIS — N30.00 ACUTE CYSTITIS WITHOUT HEMATURIA: Primary | ICD-10-CM

## 2020-04-20 PROCEDURE — 99213 OFFICE O/P EST LOW 20 MIN: CPT | Mod: 95 | Performed by: OBSTETRICS & GYNECOLOGY

## 2020-04-20 RX ORDER — CIPROFLOXACIN 500 MG/1
500 TABLET, FILM COATED ORAL 2 TIMES DAILY
Qty: 14 TABLET | Refills: 0 | Status: SHIPPED | OUTPATIENT
Start: 2020-04-20 | End: 2020-06-04

## 2020-04-20 RX ORDER — ATORVASTATIN CALCIUM 20 MG/1
20 TABLET, FILM COATED ORAL DAILY
COMMUNITY
Start: 2020-02-26 | End: 2021-02-25

## 2020-04-20 NOTE — PROGRESS NOTES
"  Candis Lino is a 70 year old female who is being evaluated via a billable telephone visit.      The patient has been notified of following:     \"This telephone visit will be conducted via a call between you and your physician/provider. We have found that certain health care needs can be provided without the need for a physical exam.  This service lets us provide the care you need with a short phone conversation.  If a prescription is necessary we can send it directly to your pharmacy.  If lab work is needed we can place an order for that and you can then stop by our lab to have the test done at a later time.    Telephone visits are billed at different rates depending on your insurance coverage. During this emergency period, for some insurers they may be billed the same as an in-person visit.  Please reach out to your insurance provider with any questions.    If during the course of the call the physician/provider feels a telephone visit is not appropriate, you will not be charged for this service.\"    Patient has given verbal consent for Telephone visit?  Yes    How would you like to obtain your AVS? Shane    Additional provider notes:        SUBJECTIVE:                                                   Candis Lino is a 70 year old female who presents to clinic today for the following health issue(s):  Patient presents with:  UTI: urgency and burning urination since Saturday  Medication Follow-up: took Azo Saturday night and it helped pt still experience the buring as of today      HPI:  Started having symptoms Saturday, feeling urgency, frequency and burning. Azo helped -took it once, less burning now. No fevers/back pain/abd pain.   Has hx UTIs. Roberto Carlos, reviewed sensitivities. Last cx done 2/18/20 was a MARYAM and was neg. Has tolerated cipro.     Review of PMH, SocHx, SurHx, FHx, medications completed. Epic updated.          No LMP recorded. Patient is postmenopausal..     Patient is sexually " active, .  Using menopause for contraception.    reports that she has never smoked. She has never used smokeless tobacco.    STD testing offered?  Declined    Health maintenance updated:  yes    Today's PHQ-2 Score:   PHQ-2 (  [employer]) 2018   Q1: Little interest or pleasure in doing things 0   Q2: Feeling down, depressed or hopeless 0   PHQ-2 Score 0     Today's PHQ-9 Score:   PHQ-9 SCORE 3/14/2019   PHQ-9 Total Score 0     Today's ANDREW-7 Score:   ANDREW-7 SCORE 3/14/2019   Total Score 0       Problem list and histories reviewed & adjusted, as indicated.  Additional history: as documented.    Patient Active Problem List   Diagnosis     Menopausal osteoporosis     Past Surgical History:   Procedure Laterality Date     NO HISTORY OF SURGERY        Social History     Tobacco Use     Smoking status: Never Smoker     Smokeless tobacco: Never Used   Substance Use Topics     Alcohol use: Yes     Alcohol/week: 0.0 standard drinks      Problem (# of Occurrences) Relation (Name,Age of Onset)    Colon Cancer (1) Father (60)    Osteoporosis (1) Sister            Current Outpatient Medications   Medication Sig     atorvastatin (LIPITOR) 20 MG tablet Take 20 mg by mouth daily     estradiol (ESTRACE) 0.1 MG/GM vaginal cream Place 1 g vaginally twice a week     GLUCOSAMINE-CHONDROITIN PO      MULTIPLE VITAMIN PO      valACYclovir (VALTREX) 1000 mg tablet as needed      atorvastatin (LIPITOR) 40 MG tablet Take 40 mg by mouth     CALCIUM PO      No current facility-administered medications for this visit.      No Known Allergies    ROS:  12 point review of systems negative other than symptoms noted below or in the HPI.        OBJECTIVE:     There were no vitals taken for this visit.  There is no height or weight on file to calculate BMI.    Exam:  No exam phone visit       ASSESSMENT/PLAN:                                                      UTI      -will treat empirically for UTI. Based on previous cultures and  sensitivities, will give rx cipro. Has tolerated this well in the past. Discussed calling back, going to UC if medication SE or worsening symptoms such as pyelo. Questions  Answered.     Celena Lew Masters, [facility] FOR [facility]    Phone call duration: 5 minutes

## 2020-06-03 NOTE — PROGRESS NOTES
SUBJECTIVE:                                                   Candis Lino is a 70 year old female who presents to clinic today for the following health issue(s):  Patient presents with:  UTI: Patient states she has burning during urination and frequency in urination. Patient states it started yesterday and she took 2 tablets of AZO yesterday morning. The symptoms are not as bad as yesterday.         HPI:  Pt here today with c/o dyrsuria and frequency of urination. AZO has helped, but she still has symptoms.     She has frequent UTI's and was started on vaginal E2 cream in 2020. She has been good about using this.     No LMP recorded. Patient is postmenopausal..     Patient is sexually active, .  Using menopause for contraception.    reports that she has never smoked. She has never used smokeless tobacco.    STD testing offered?  Declined    Health maintenance updated:  no, Due for a mammogram    Today's PHQ-2 Score:   PHQ-2 (  [employer]) 2018   Q1: Little interest or pleasure in doing things 0   Q2: Feeling down, depressed or hopeless 0   PHQ-2 Score 0     Today's PHQ-9 Score:   PHQ-9 SCORE 3/14/2019   PHQ-9 Total Score 0     Today's ANDREW-7 Score:   ANDREW-7 SCORE 3/14/2019   Total Score 0       Problem list and histories reviewed & adjusted, as indicated.  Additional history: as documented.    Patient Active Problem List   Diagnosis     Menopausal osteoporosis     Frequent UTI     Past Surgical History:   Procedure Laterality Date     NO HISTORY OF SURGERY        Social History     Tobacco Use     Smoking status: Never Smoker     Smokeless tobacco: Never Used   Substance Use Topics     Alcohol use: Yes     Alcohol/week: 0.0 standard drinks      Problem (# of Occurrences) Relation (Name,Age of Onset)    Colon Cancer (1) Father (60)    No Known Problems (6) Mother, Brother, Maternal Grandmother, Maternal Grandfather, Paternal Grandmother, Other    Osteoporosis (1) Sister         "    Current Outpatient Medications   Medication Sig     atorvastatin (LIPITOR) 20 MG tablet Take 20 mg by mouth daily     CALCIUM PO      ciprofloxacin (CIPRO) 500 MG tablet Take 1 tablet (500 mg) by mouth 2 times daily     estradiol (ESTRACE) 0.1 MG/GM vaginal cream Place 1 g vaginally twice a week     GLUCOSAMINE-CHONDROITIN PO      MULTIPLE VITAMIN PO      valACYclovir (VALTREX) 1000 mg tablet as needed      No current facility-administered medications for this visit.      No Known Allergies    ROS:  12 point review of systems negative other than symptoms noted below or in the HPI.  Genitourinary: Painful Urination and Urgency  POSITIVE for:, dysuria, frequency, hx of urinary tract infections      OBJECTIVE:     /80 (BP Location: Right arm, Patient Position: Sitting, Cuff Size: Adult Regular)   Pulse 68   Ht 1.702 m (5' 7\")   Wt 62.8 kg (138 lb 6.4 oz)   Breastfeeding No   BMI 21.68 kg/m    Body mass index is 21.68 kg/m .    Exam:  Constitutional:  Appearance: Well nourished, well developed alert, in no acute distress     In-Clinic Test Results:  Results for orders placed or performed in visit on 06/04/20 (from the past 24 hour(s))   UA without Microscopic   Result Value Ref Range    Color Urine Yellow     Appearance Urine Clear     Glucose Urine Negative NEG^Negative mg/dL    Bilirubin Urine Negative NEG^Negative    Ketones Urine Negative NEG^Negative mg/dL    Specific Gravity Urine 1.015 1.003 - 1.035    Blood Urine 3+ (A) NEG^Negative    pH Urine 7.0 5.0 - 7.0 pH    Protein Albumin Urine 1+ (A) NEG^Negative mg/dL    Urobilinogen Urine 0.2 0.2 - 1.0 EU/dL    Nitrite Urine Negative NEG^Negative    Leukocyte Esterase Urine 3+ (A) NEG^Negative    Source Midstream Urine        ASSESSMENT/PLAN:                                                        ICD-10-CM    1. Dysuria  R30.0 UA without Microscopic   2. Acute cystitis with hematuria  N30.01 ciprofloxacin (CIPRO) 500 MG tablet   3. Frequent UTI  N39.0 " UA without Microscopic     Urine Culture Aerobic Bacterial       There are no Patient Instructions on file for this visit.    UA +, will treat and send UC. Needs MARYAM again. endorsed E2 cream use.     EDVIN Yancey CNP  [facility]

## 2020-06-04 ENCOUNTER — OFFICE VISIT (OUTPATIENT)
Dept: OBGYN | Facility: CLINIC | Age: 70
End: 2020-06-04
Payer: COMMERCIAL

## 2020-06-04 VITALS
WEIGHT: 138.4 LBS | HEIGHT: 67 IN | BODY MASS INDEX: 21.72 KG/M2 | DIASTOLIC BLOOD PRESSURE: 80 MMHG | HEART RATE: 68 BPM | SYSTOLIC BLOOD PRESSURE: 102 MMHG

## 2020-06-04 DIAGNOSIS — N30.01 ACUTE CYSTITIS WITH HEMATURIA: ICD-10-CM

## 2020-06-04 DIAGNOSIS — R30.0 DYSURIA: Primary | ICD-10-CM

## 2020-06-04 DIAGNOSIS — N39.0 FREQUENT UTI: ICD-10-CM

## 2020-06-04 LAB
ALBUMIN UR-MCNC: ABNORMAL MG/DL
APPEARANCE UR: CLEAR
BILIRUB UR QL STRIP: NEGATIVE
COLOR UR AUTO: YELLOW
GLUCOSE UR STRIP-MCNC: NEGATIVE MG/DL
HGB UR QL STRIP: ABNORMAL
KETONES UR STRIP-MCNC: NEGATIVE MG/DL
LEUKOCYTE ESTERASE UR QL STRIP: ABNORMAL
NITRATE UR QL: NEGATIVE
PH UR STRIP: 7 PH (ref 5–7)
SOURCE: ABNORMAL
SP GR UR STRIP: 1.01 (ref 1–1.03)
UROBILINOGEN UR STRIP-ACNC: 0.2 EU/DL (ref 0.2–1)

## 2020-06-04 PROCEDURE — 99213 OFFICE O/P EST LOW 20 MIN: CPT | Performed by: NURSE PRACTITIONER

## 2020-06-04 PROCEDURE — 81003 URINALYSIS AUTO W/O SCOPE: CPT | Performed by: NURSE PRACTITIONER

## 2020-06-04 RX ORDER — CIPROFLOXACIN 500 MG/1
500 TABLET, FILM COATED ORAL 2 TIMES DAILY
Qty: 14 TABLET | Refills: 0 | Status: SHIPPED | OUTPATIENT
Start: 2020-06-04 | End: 2020-06-20

## 2020-06-04 ASSESSMENT — MIFFLIN-ST. JEOR: SCORE: 1180.41

## 2020-06-17 DIAGNOSIS — N39.0 FREQUENT UTI: ICD-10-CM

## 2020-06-17 LAB
ALBUMIN UR-MCNC: NEGATIVE MG/DL
APPEARANCE UR: CLEAR
BILIRUB UR QL STRIP: NEGATIVE
COLOR UR AUTO: YELLOW
GLUCOSE UR STRIP-MCNC: NEGATIVE MG/DL
HGB UR QL STRIP: NEGATIVE
KETONES UR STRIP-MCNC: NEGATIVE MG/DL
LEUKOCYTE ESTERASE UR QL STRIP: NEGATIVE
NITRATE UR QL: NEGATIVE
PH UR STRIP: 7 PH (ref 5–7)
SOURCE: NORMAL
SP GR UR STRIP: 1.02 (ref 1–1.03)
UROBILINOGEN UR STRIP-ACNC: 0.2 EU/DL (ref 0.2–1)

## 2020-06-17 PROCEDURE — 81003 URINALYSIS AUTO W/O SCOPE: CPT | Performed by: NURSE PRACTITIONER

## 2020-06-17 PROCEDURE — 87086 URINE CULTURE/COLONY COUNT: CPT | Performed by: NURSE PRACTITIONER

## 2020-06-18 LAB
BACTERIA SPEC CULT: NO GROWTH
Lab: NORMAL
SPECIMEN SOURCE: NORMAL

## 2020-06-20 ENCOUNTER — OFFICE VISIT (OUTPATIENT)
Dept: URGENT CARE | Facility: URGENT CARE | Age: 70
End: 2020-06-20
Payer: COMMERCIAL

## 2020-06-20 VITALS
OXYGEN SATURATION: 98 % | SYSTOLIC BLOOD PRESSURE: 126 MMHG | DIASTOLIC BLOOD PRESSURE: 71 MMHG | TEMPERATURE: 97.1 F | HEART RATE: 57 BPM

## 2020-06-20 DIAGNOSIS — R30.0 DYSURIA: ICD-10-CM

## 2020-06-20 DIAGNOSIS — N30.90 BLADDER INFECTION: Primary | ICD-10-CM

## 2020-06-20 LAB

## 2020-06-20 PROCEDURE — 87086 URINE CULTURE/COLONY COUNT: CPT | Performed by: FAMILY MEDICINE

## 2020-06-20 PROCEDURE — 99203 OFFICE O/P NEW LOW 30 MIN: CPT | Performed by: FAMILY MEDICINE

## 2020-06-20 PROCEDURE — 81001 URINALYSIS AUTO W/SCOPE: CPT | Performed by: FAMILY MEDICINE

## 2020-06-20 RX ORDER — NITROFURANTOIN 25; 75 MG/1; MG/1
CAPSULE ORAL
Qty: 21 CAPSULE | Refills: 0 | Status: SHIPPED | OUTPATIENT
Start: 2020-06-20 | End: 2020-07-09

## 2020-06-20 NOTE — PATIENT INSTRUCTIONS
Take prescribed medication as directed.    Keep up fluid intake.    See your doctor for follow up as planned.

## 2020-06-20 NOTE — PROGRESS NOTES
CHIEF COMPLAINT    Dysuria.  H/O recent UTI.      HISTORY    She has re developed dysuria. No fever or abd pain.    Just had a U/A-U/C 3 days ago which was neg.    Has had recent UTI's treated with Cipro.      Patient Active Problem List   Diagnosis     Menopausal osteoporosis     Frequent UTI       REVIEW OF SYSTEMS    No fever  No cough or sob  No abd pain  No LBP      Past Medical History:   Diagnosis Date     Basal cell carcinoma of skin      History of hormone replacement therapy     stopped 2005     Menopausal osteoporosis 02/13/2015    Fosamax Holiday started 2015     Stress incontinence, female      Urinary tract infection     Had 3 visits for UTI in 2010. Last culture Pos for Proteus.         EXAM  /71   Pulse 57   Temp 97.1  F (36.2  C) (Temporal)   SpO2 98%     Thin, well appearing  HEENT unremarkable  Chest non labored resp  Abd non distended  Back no CVAT      Results for orders placed or performed in visit on 06/20/20   UA reflex to Microscopic and Culture     Status: Abnormal    Specimen: Midstream Urine   Result Value Ref Range    Color Urine Yellow     Appearance Urine Clear     Glucose Urine Negative NEG^Negative mg/dL    Bilirubin Urine Negative NEG^Negative    Ketones Urine Negative NEG^Negative mg/dL    Specific Gravity Urine 1.010 1.003 - 1.035    Blood Urine Large (A) NEG^Negative    pH Urine 6.5 5.0 - 7.0 pH    Protein Albumin Urine Negative NEG^Negative mg/dL    Urobilinogen Urine 0.2 0.2 - 1.0 EU/dL    Nitrite Urine Negative NEG^Negative    Leukocyte Esterase Urine Moderate (A) NEG^Negative    Source Midstream Urine    Urine Microscopic     Status: Abnormal   Result Value Ref Range    WBC Urine 25-50 (A) OTO5^0 - 5 /HPF    RBC Urine  (A) OTO2^O - 2 /HPF    Squamous Epithelial /LPF Urine Few FEW^Few /LPF    Bacteria Urine Few (A) NEG^Negative /HPF       (N30.90) Bladder infection  (primary encounter diagnosis)  Comment:   Plan: nitroFURantoin macrocrystal-monohydrate          (MACROBID) 100 MG capsule            (R30.0) Dysuria  Comment:   Plan: UA reflex to Microscopic and Culture, Urine         Microscopic, Urine Culture Aerobic Bacterial

## 2020-06-21 LAB
BACTERIA SPEC CULT: NO GROWTH
SPECIMEN SOURCE: NORMAL

## 2020-07-08 NOTE — PROGRESS NOTES
Candis is a 70 year old  female who presents for Medicare Limited exam.     Do you have a Health Care Directive?: No, advance care planning information given to patient to review.  Advanced care planning was discussed at today's visit.    Fall risk:   Fall Risk Assessment completed per order.    HPI : Retired!  Still having symptoms of UTI. Has abnormal UAs but UCs are no growth for the last two encounters. Just seen . UA had 25-30 WBC and  RBC. The only positive culture was in Feb showing Klebsiella.  DEXA  has osteopenia but L1-2 is -2.8. Pt on Fosamax drug holiday since . Hx of pelvic fracture.    GYNECOLOGIC HISTORY:  No LMP recorded. Patient is postmenopausal..   reports that she has never smoked. She has never used smokeless tobacco.  STD testing offered?  Declined    Last PHQ-9 score on record=   PHQ-9 SCORE 2020   PHQ-9 Total Score 0     Last GAD7 score on record=   ANDREW-7 SCORE 3/8/2018 3/14/2019 2020   Total Score 0 0 0       HEALTH MAINTENANCE:  Cholesterol: followed by primary care provider, found in Care Everywhere   20   Total= 182, Triglycerides=65, HDL=72, LDL=97  Last Mammo: 20, [facility], Result: Normal, Next Mammo: one year   Pap: no further recommended over age 65  DEXA:  3/8/18  Lumbar Spine (L1-L4) T-score:  -1.5  Left Femoral Neck T-score:  -1.2  Right Femoral Neck T-score:  -0.9  Colonoscopy:  18, Result:  Normal, Next Colonoscopy: 5 years, due to family history    HISTORY:  OB History    Para Term  AB Living   3 3 3 0 0 3   SAB TAB Ectopic Multiple Live Births   0 0 0 0 3      # Outcome Date GA Lbr Pascual/2nd Weight Sex Delivery Anes PTL Lv   3 Term         ADITYA   2 Term         ADITYA   1 Term         ADITYA     Past Medical History:   Diagnosis Date     Basal cell carcinoma of skin      History of hormone replacement therapy     stopped      Menopausal osteoporosis 2015    Fosamax Holiday started       Stress incontinence, female      Urinary tract infection     Had 3 visits for UTI in 2010. Last culture Pos for Proteus.     Past Surgical History:   Procedure Laterality Date     NO HISTORY OF SURGERY       Family History   Problem Relation Age of Onset     Colon Cancer Father 60     Osteoporosis Sister      No Known Problems Mother      No Known Problems Brother      No Known Problems Maternal Grandmother      No Known Problems Maternal Grandfather      No Known Problems Paternal Grandmother      No Known Problems Other      Social History     Socioeconomic History     Marital status:      Spouse name: Not on file     Number of children: 3     Years of education: Not on file     Highest education level: Not on file   Occupational History     Not on file   Social Needs     Financial resource strain: Not on file     Food insecurity     Worry: Not on file     Inability: Not on file     Transportation needs     Medical: Not on file     Non-medical: Not on file   Tobacco Use     Smoking status: Never Smoker     Smokeless tobacco: Never Used   Substance and Sexual Activity     Alcohol use: Yes     Alcohol/week: 0.0 standard drinks     Drug use: Never     Sexual activity: Yes     Partners: Male     Birth control/protection: Post-menopausal   Lifestyle     Physical activity     Days per week: Not on file     Minutes per session: Not on file     Stress: Not on file   Relationships     Social connections     Talks on phone: Not on file     Gets together: Not on file     Attends Holiness service: Not on file     Active member of club or organization: Not on file     Attends meetings of clubs or organizations: Not on file     Relationship status: Not on file     Intimate partner violence     Fear of current or ex partner: Not on file     Emotionally abused: Not on file     Physically abused: Not on file     Forced sexual activity: Not on file   Other Topics Concern     Parent/sibling w/ CABG, MI or angioplasty before  "65F 55M? Not Asked   Social History Narrative    [employer]     Current Outpatient Medications   Medication Sig     atorvastatin (LIPITOR) 20 MG tablet Take 20 mg by mouth daily     CALCIUM PO      estradiol (ESTRACE) 0.1 MG/GM vaginal cream Place 1 g vaginally twice a week     GLUCOSAMINE-CHONDROITIN PO      MULTIPLE VITAMIN PO      valACYclovir (VALTREX) 1000 mg tablet as needed      No current facility-administered medications for this visit.      No Known Allergies    Past medical, surgical, social and family history were reviewed and updated in EPIC.    EXAM:  /62   Ht 1.715 m (5' 7.5\")   Wt 62.1 kg (137 lb)   BMI 21.14 kg/m     BMI: Body mass index is 21.14 kg/m .    Constitutional: Appearance: Well nourished, well developed alert, in no acute distress  Breasts: Inspection of Breasts:  No lymphadenopathy present    Palpation of Breasts and Axillae:  No masses present on palpation, no  breast tenderness    Axillary Lymph Nodes:  No lymphadenopathy present  Neurologic/Psychiatric:    Mental Status:  Oriented X3     Pelvic Exam:  External Genitalia:     Normal appearance for age, no discharge present, no tenderness present, no inflammatory lesions present, color normal  Vagina:     Normal vaginal vault without central or paravaginal defects, no discharge present, no inflammatory lesions present, no masses present  Bladder:     Nontender to palpation  Urethra:   Urethral Body:  Urethra palpation normal, urethra structural support normal   Urethral Meatus:  No erythema or lesions present  Cervix:     Appearance healthy, no lesions present, nontender to palpation, no bleeding present  Uterus:     Uterus: firm, normal sized and nontender, anteverted in position.   Adnexa:     No adnexal tenderness present, no adnexal masses present  Perineum:     Perineum within normal limits, no evidence of trauma, no rashes or skin lesions present  Anus:     Anus within normal limits, no hemorrhoids " present  Inguinal Lymph Nodes:     No lymphadenopathy present  Pubic Hair:     Normal pubic hair distribution for age  Genitalia and Groin:     No rashes present, no lesions present, no areas of discoloration, no masses present      Body mass index is 21.14 kg/m .     reports that she has never smoked. She has never used smokeless tobacco.      ASSESSMENT:  70 year old female with satisfactory annual exam.    ICD-10-CM    1. Encounter for gynecological examination without abnormal finding  Z01.419    2. Atrophic vaginitis  N95.2 estradiol (ESTRACE) 0.1 MG/GM vaginal cream   3. Dysuria  R30.0    4. Osteoporosis, senile  M81.0        COUNSELING:   Reviewed preventive health counseling, as reflected in patient instructions       Regular exercise    PLAN/PATIENT INSTRUCTIONS:  Needs eval by urology for dysuria and negative UCs.  Needs consult with Rheumatology to determine if needs to go back on therapy due to L1-2  Continue vaginal estrogen. Appears to be working. Doubt abnormal UA is from atrophic changes.     Justin Kyle MD

## 2020-07-09 ENCOUNTER — OFFICE VISIT (OUTPATIENT)
Dept: OBGYN | Facility: CLINIC | Age: 70
End: 2020-07-09
Payer: COMMERCIAL

## 2020-07-09 VITALS
BODY MASS INDEX: 20.76 KG/M2 | HEIGHT: 68 IN | SYSTOLIC BLOOD PRESSURE: 102 MMHG | DIASTOLIC BLOOD PRESSURE: 62 MMHG | WEIGHT: 137 LBS

## 2020-07-09 DIAGNOSIS — R30.0 DYSURIA: ICD-10-CM

## 2020-07-09 DIAGNOSIS — M81.0 OSTEOPOROSIS, SENILE: ICD-10-CM

## 2020-07-09 DIAGNOSIS — N95.2 ATROPHIC VAGINITIS: ICD-10-CM

## 2020-07-09 DIAGNOSIS — Z01.419 ENCOUNTER FOR GYNECOLOGICAL EXAMINATION WITHOUT ABNORMAL FINDING: Primary | ICD-10-CM

## 2020-07-09 PROCEDURE — 99214 OFFICE O/P EST MOD 30 MIN: CPT | Performed by: OBSTETRICS & GYNECOLOGY

## 2020-07-09 RX ORDER — ESTRADIOL 0.1 MG/G
1 CREAM VAGINAL
Qty: 42.5 G | Refills: 1 | Status: SHIPPED | OUTPATIENT
Start: 2020-07-09 | End: 2021-07-14

## 2020-07-09 ASSESSMENT — ANXIETY QUESTIONNAIRES
2. NOT BEING ABLE TO STOP OR CONTROL WORRYING: NOT AT ALL
1. FEELING NERVOUS, ANXIOUS, OR ON EDGE: NOT AT ALL
IF YOU CHECKED OFF ANY PROBLEMS ON THIS QUESTIONNAIRE, HOW DIFFICULT HAVE THESE PROBLEMS MADE IT FOR YOU TO DO YOUR WORK, TAKE CARE OF THINGS AT HOME, OR GET ALONG WITH OTHER PEOPLE: NOT DIFFICULT AT ALL
3. WORRYING TOO MUCH ABOUT DIFFERENT THINGS: NOT AT ALL
7. FEELING AFRAID AS IF SOMETHING AWFUL MIGHT HAPPEN: NOT AT ALL
GAD7 TOTAL SCORE: 0
6. BECOMING EASILY ANNOYED OR IRRITABLE: NOT AT ALL
5. BEING SO RESTLESS THAT IT IS HARD TO SIT STILL: NOT AT ALL

## 2020-07-09 ASSESSMENT — PATIENT HEALTH QUESTIONNAIRE - PHQ9
SUM OF ALL RESPONSES TO PHQ QUESTIONS 1-9: 0
5. POOR APPETITE OR OVEREATING: NOT AT ALL

## 2020-07-09 ASSESSMENT — MIFFLIN-ST. JEOR: SCORE: 1181.99

## 2020-07-10 ASSESSMENT — ANXIETY QUESTIONNAIRES: GAD7 TOTAL SCORE: 0

## 2020-11-29 ENCOUNTER — HEALTH MAINTENANCE LETTER (OUTPATIENT)
Age: 70
End: 2020-11-29

## 2021-05-30 ENCOUNTER — HEALTH MAINTENANCE LETTER (OUTPATIENT)
Age: 71
End: 2021-05-30

## 2021-07-12 NOTE — PROGRESS NOTES
Candis is a 71 year old  female who presents for Medicare Limited exam.     Do you have a Health Care Directive?: No, advance care planning information given to patient to review.  Patient declined advance care planning discussion at this time.    Fall risk:   Fallen 2 or more times in the past year?: No  Any fall with injury in the past year?: No    HPI :  No concerns  Runner  Has some mild SHITAL, uses poise pad when she runs  Daily dab of vaginal estrogen due to prior frequent uti  3 kids  Lives in [place]  Osteoporosis in spine, is back on Fosamax, sees Rheum at [facility]  No other gyn concerns    GYNECOLOGIC HISTORY:  No LMP recorded. Patient is postmenopausal..   reports that she has never smoked. She has never used smokeless tobacco.    STD testing offered?  Declined  Last PHQ-9 score on record=   PHQ-9 SCORE 2021   PHQ-9 Total Score 0     Last GAD7 score on record=   ANDREW-7 SCORE 3/14/2019 2020 2021   Total Score 0 0 0       HEALTH MAINTENANCE:  Cholesterol: Managed by PCP  Last Mammo: 2021, [facility], Result: Normal , Next Mammo: 2022   Pap: no further recommended over age 65  DEXA: 10/2020 health Exosect care everywhere  Colonoscopy:  18, Result:  Normal, Next Colonoscopy: 5 years, due to family history    HISTORY:  OB History    Para Term  AB Living   3 3 3 0 0 3   SAB TAB Ectopic Multiple Live Births   0 0 0 0 3      # Outcome Date GA Lbr Pascual/2nd Weight Sex Delivery Anes PTL Lv   3 Term         ADITYA   2 Term         ADITYA   1 Term         ADITYA     Past Medical History:   Diagnosis Date     Basal cell carcinoma of skin      History of hormone replacement therapy     stopped      Menopausal osteoporosis 2015    Fosamax Holiday started      Stress incontinence, female      Urinary tract infection     Had 3 visits for UTI in . Last culture Pos for Proteus.     Past Surgical History:   Procedure Laterality Date     NO HISTORY OF  SURGERY       Family History   Problem Relation Age of Onset     Colon Cancer Father 60     Osteoporosis Sister      No Known Problems Mother      No Known Problems Brother      No Known Problems Maternal Grandmother      No Known Problems Maternal Grandfather      No Known Problems Paternal Grandmother      No Known Problems Other      Social History     Socioeconomic History     Marital status:      Spouse name: Not on file     Number of children: 3     Years of education: Not on file     Highest education level: Not on file   Occupational History     Not on file   Tobacco Use     Smoking status: Never Smoker     Smokeless tobacco: Never Used   Substance and Sexual Activity     Alcohol use: Yes     Alcohol/week: 0.0 standard drinks     Drug use: Never     Sexual activity: Yes     Partners: Male     Birth control/protection: Post-menopausal   Other Topics Concern     Parent/sibling w/ CABG, MI or angioplasty before 65F 55M? Not Asked   Social History Narrative    [employer]     Social Determinants of Health     Financial Resource Strain:      Difficulty of Paying Living Expenses:    Food Insecurity:      Worried About Running Out of Food in the Last Year:      Ran Out of Food in the Last Year:    Transportation Needs:      Lack of Transportation (Medical):      Lack of Transportation (Non-Medical):    Physical Activity:      Days of Exercise per Week:      Minutes of Exercise per Session:    Stress:      Feeling of Stress :    Social Connections:      Frequency of Communication with Friends and Family:      Frequency of Social Gatherings with Friends and Family:      Attends Shinto Services:      Active Member of Clubs or Organizations:      Attends Club or Organization Meetings:      Marital Status:    Intimate Partner Violence:      Fear of Current or Ex-Partner:      Emotionally Abused:      Physically Abused:      Sexually Abused:      Current Outpatient Medications   Medication Sig  "    CALCIUM PO      estradiol (ESTRACE) 0.1 MG/GM vaginal cream Place 1 g vaginally twice a week     GLUCOSAMINE-CHONDROITIN PO      MULTIPLE VITAMIN PO      valACYclovir (VALTREX) 1000 mg tablet as needed      atorvastatin (LIPITOR) 20 MG tablet Take 20 mg by mouth daily     No current facility-administered medications for this visit.     No Known Allergies    Past medical, surgical, social and family history were reviewed and updated in EPIC.    EXAM:  /60   Ht 1.708 m (5' 7.25\")   Wt 62.1 kg (136 lb 12.8 oz)   Breastfeeding No   BMI 21.27 kg/m     BMI: Body mass index is 21.27 kg/m .    Constitutional: Appearance: Well nourished, well developed alert, in no acute distress  Breasts: Inspection of Breasts:  No lymphadenopathy present    Palpation of Breasts and Axillae:  No masses present on palpation, no  breast tenderness    Axillary Lymph Nodes:  No lymphadenopathy present  Neurologic/Psychiatric:    Mental Status:  Oriented X3     Pelvic Exam:  External Genitalia:     Normal appearance for age, no discharge present, no tenderness present, no inflammatory lesions present, color normal  Vagina:     Normal vaginal vault without central or paravaginal defects, ATROPHIC  Bladder:     Nontender to palpation  Urethra:   Urethral Body:  Urethra palpation normal, urethra structural support normal   Urethral Meatus:  No erythema or lesions present  Cervix:     Appearance healthy, no lesions present, nontender to palpation, no bleeding present  Uterus:     Nontender to palpation, no masses present, position anteflexed, mobility: normal  Adnexa:     No adnexal tenderness present, no adnexal masses present  Perineum:     Perineum within normal limits, no evidence of trauma, no rashes or skin lesions present  Inguinal Lymph Nodes:     No lymphadenopathy present      Body mass index is 21.27 kg/m .     reports that she has never smoked. She has never used smokeless tobacco.      ASSESSMENT:  71 year old female with " satisfactory annual exam.    ICD-10-CM    1. Encounter for gynecological examination without abnormal finding  Z01.419    2. Atrophic vaginitis  N95.2 estradiol (ESTRACE) 0.1 MG/GM vaginal cream           PLAN/PATIENT INSTRUCTIONS:    Continue daily small amount of vaginal estrogen cream  Estrogen cream refilled  We did annual gyn exam    Araseli Joseph MD

## 2021-07-14 ENCOUNTER — OFFICE VISIT (OUTPATIENT)
Dept: OBGYN | Facility: CLINIC | Age: 71
End: 2021-07-14
Payer: COMMERCIAL

## 2021-07-14 VITALS
HEIGHT: 67 IN | DIASTOLIC BLOOD PRESSURE: 60 MMHG | BODY MASS INDEX: 21.47 KG/M2 | WEIGHT: 136.8 LBS | SYSTOLIC BLOOD PRESSURE: 102 MMHG

## 2021-07-14 DIAGNOSIS — N95.2 ATROPHIC VAGINITIS: ICD-10-CM

## 2021-07-14 DIAGNOSIS — Z01.419 ENCOUNTER FOR GYNECOLOGICAL EXAMINATION WITHOUT ABNORMAL FINDING: Primary | ICD-10-CM

## 2021-07-14 PROCEDURE — 99214 OFFICE O/P EST MOD 30 MIN: CPT | Performed by: OBSTETRICS & GYNECOLOGY

## 2021-07-14 RX ORDER — ESTRADIOL 0.1 MG/G
1 CREAM VAGINAL
Qty: 42.5 G | Refills: 4 | Status: SHIPPED | OUTPATIENT
Start: 2021-07-15 | End: 2022-07-22

## 2021-07-14 ASSESSMENT — ANXIETY QUESTIONNAIRES
6. BECOMING EASILY ANNOYED OR IRRITABLE: NOT AT ALL
3. WORRYING TOO MUCH ABOUT DIFFERENT THINGS: NOT AT ALL
GAD7 TOTAL SCORE: 0
5. BEING SO RESTLESS THAT IT IS HARD TO SIT STILL: NOT AT ALL
IF YOU CHECKED OFF ANY PROBLEMS ON THIS QUESTIONNAIRE, HOW DIFFICULT HAVE THESE PROBLEMS MADE IT FOR YOU TO DO YOUR WORK, TAKE CARE OF THINGS AT HOME, OR GET ALONG WITH OTHER PEOPLE: NOT DIFFICULT AT ALL
1. FEELING NERVOUS, ANXIOUS, OR ON EDGE: NOT AT ALL
7. FEELING AFRAID AS IF SOMETHING AWFUL MIGHT HAPPEN: NOT AT ALL
2. NOT BEING ABLE TO STOP OR CONTROL WORRYING: NOT AT ALL

## 2021-07-14 ASSESSMENT — MIFFLIN-ST. JEOR: SCORE: 1172.11

## 2021-07-14 ASSESSMENT — PATIENT HEALTH QUESTIONNAIRE - PHQ9
SUM OF ALL RESPONSES TO PHQ QUESTIONS 1-9: 0
5. POOR APPETITE OR OVEREATING: NOT AT ALL

## 2021-07-15 ASSESSMENT — ANXIETY QUESTIONNAIRES: GAD7 TOTAL SCORE: 0

## 2021-09-19 ENCOUNTER — HEALTH MAINTENANCE LETTER (OUTPATIENT)
Age: 71
End: 2021-09-19

## 2022-01-09 ENCOUNTER — HEALTH MAINTENANCE LETTER (OUTPATIENT)
Age: 72
End: 2022-01-09

## 2022-07-22 ENCOUNTER — OFFICE VISIT (OUTPATIENT)
Dept: OBGYN | Facility: CLINIC | Age: 72
End: 2022-07-22
Payer: COMMERCIAL

## 2022-07-22 VITALS
SYSTOLIC BLOOD PRESSURE: 104 MMHG | HEIGHT: 67 IN | DIASTOLIC BLOOD PRESSURE: 64 MMHG | BODY MASS INDEX: 21.63 KG/M2 | WEIGHT: 137.8 LBS

## 2022-07-22 DIAGNOSIS — Z01.419 ENCOUNTER FOR GYNECOLOGICAL EXAMINATION WITHOUT ABNORMAL FINDING: Primary | ICD-10-CM

## 2022-07-22 DIAGNOSIS — N95.2 ATROPHIC VAGINITIS: ICD-10-CM

## 2022-07-22 PROCEDURE — 99213 OFFICE O/P EST LOW 20 MIN: CPT | Mod: 25 | Performed by: OBSTETRICS & GYNECOLOGY

## 2022-07-22 PROCEDURE — G0101 CA SCREEN;PELVIC/BREAST EXAM: HCPCS | Performed by: OBSTETRICS & GYNECOLOGY

## 2022-07-22 RX ORDER — ALENDRONATE SODIUM 70 MG/1
TABLET ORAL
COMMUNITY
Start: 2022-06-06

## 2022-07-22 RX ORDER — ESTRADIOL 0.1 MG/G
1 CREAM VAGINAL
Qty: 42.5 G | Refills: 4 | Status: SHIPPED | OUTPATIENT
Start: 2022-07-25 | End: 2022-08-10

## 2022-07-22 RX ORDER — ATORVASTATIN CALCIUM 20 MG/1
20 TABLET, FILM COATED ORAL DAILY
COMMUNITY
Start: 2022-03-08

## 2022-07-22 ASSESSMENT — ANXIETY QUESTIONNAIRES
3. WORRYING TOO MUCH ABOUT DIFFERENT THINGS: NOT AT ALL
1. FEELING NERVOUS, ANXIOUS, OR ON EDGE: NOT AT ALL
7. FEELING AFRAID AS IF SOMETHING AWFUL MIGHT HAPPEN: NOT AT ALL
5. BEING SO RESTLESS THAT IT IS HARD TO SIT STILL: NOT AT ALL
6. BECOMING EASILY ANNOYED OR IRRITABLE: NOT AT ALL
IF YOU CHECKED OFF ANY PROBLEMS ON THIS QUESTIONNAIRE, HOW DIFFICULT HAVE THESE PROBLEMS MADE IT FOR YOU TO DO YOUR WORK, TAKE CARE OF THINGS AT HOME, OR GET ALONG WITH OTHER PEOPLE: NOT DIFFICULT AT ALL
2. NOT BEING ABLE TO STOP OR CONTROL WORRYING: NOT AT ALL
GAD7 TOTAL SCORE: 0
GAD7 TOTAL SCORE: 0

## 2022-07-22 ASSESSMENT — PATIENT HEALTH QUESTIONNAIRE - PHQ9
SUM OF ALL RESPONSES TO PHQ QUESTIONS 1-9: 0
5. POOR APPETITE OR OVEREATING: NOT AT ALL

## 2022-07-22 NOTE — PROGRESS NOTES
Candis is a 72 year old  female who presents for Medicare Limited exam.     Do you have a Health Care Directive?: No, advance care planning information given to patient to review.  Patient plans to discuss their wishes with loved ones or provider.      Fall risk:   Fallen 2 or more times in the past year?: No  Any fall with injury in the past year?: No    HPI :  Patient is doing well.  She has no concerns.  She would like a refill of her vaginal estrogen.    GYNECOLOGIC HISTORY:  No LMP recorded. Patient is postmenopausal..   reports that she has never smoked. She has never used smokeless tobacco.    STD testing offered?  Declined  Last PHQ-9 score on record=   PHQ-9 SCORE 2022   PHQ-9 Total Score 0     Last GAD7 score on record=   ANDREW-7 SCORE 2020   Total Score 0 0 0       HEALTH MAINTENANCE:  Cholesterol: followed by PCP   Last Mammo:22, Result: Normal, Next Mammo:   Pap: (No results found for: PAP )  DEXA:  3/8/18  Colonoscopy:  18, Result:  Normal, Next Colonoscopy: 5 years Due .    HISTORY:  OB History    Para Term  AB Living   3 3 3 0 0 3   SAB IAB Ectopic Multiple Live Births   0 0 0 0 3      # Outcome Date GA Lbr Pascual/2nd Weight Sex Delivery Anes PTL Lv   3 Term         ADITYA   2 Term         ADITYA   1 Term         ADITYA     Past Medical History:   Diagnosis Date     Basal cell carcinoma of skin      History of hormone replacement therapy     stopped      Menopausal osteoporosis 2015    Fosamax Holiday started      Stress incontinence, female      Urinary tract infection     Had 3 visits for UTI in . Last culture Pos for Proteus.     Past Surgical History:   Procedure Laterality Date     NO HISTORY OF SURGERY       Family History   Problem Relation Age of Onset     Colon Cancer Father 60     Osteoporosis Sister      No Known Problems Mother      No Known Problems Brother      No Known Problems Maternal Grandmother   "    No Known Problems Maternal Grandfather      No Known Problems Paternal Grandmother      No Known Problems Other      Social History     Socioeconomic History     Marital status:      Number of children: 3   Tobacco Use     Smoking status: Never Smoker     Smokeless tobacco: Never Used   Substance and Sexual Activity     Alcohol use: Yes     Alcohol/week: 0.0 standard drinks     Drug use: Never     Sexual activity: Yes     Partners: Male     Birth control/protection: Post-menopausal   Social History Narrative    [employer]     Current Outpatient Medications   Medication Sig     alendronate (FOSAMAX) 70 MG tablet      atorvastatin (LIPITOR) 20 MG tablet Take 20 mg by mouth daily     CALCIUM PO      [START ON 7/25/2022] estradiol (ESTRACE) 0.1 MG/GM vaginal cream Place 1 g vaginally twice a week     GLUCOSAMINE-CHONDROITIN PO      MULTIPLE VITAMIN PO      valACYclovir (VALTREX) 1000 mg tablet as needed      No current facility-administered medications for this visit.     No Known Allergies    Past medical, surgical, social and family history were reviewed and updated in EPIC.    EXAM:  /64   Ht 1.695 m (5' 6.75\")   Wt 62.5 kg (137 lb 12.8 oz)   BMI 21.74 kg/m     BMI: Body mass index is 21.74 kg/m .    Constitutional: Appearance: Well nourished, well developed alert, in no acute distress  Breasts: Inspection of Breasts:  No lymphadenopathy present    Palpation of Breasts and Axillae:  No masses present on palpation, no  breast tenderness    Axillary Lymph Nodes:  No lymphadenopathy present  Neurologic/Psychiatric:    Mental Status:  Oriented X3     Pelvic Exam:  External Genitalia:     Normal appearance for age, no discharge present, no tenderness present, no inflammatory lesions present, color normal  Vagina:     Normal vaginal vault without central or paravaginal defects, ATROPHIC  Bladder:     Nontender to palpation  Urethra:   Urethral Body:  Urethra palpation normal, urethra " structural support normal   Urethral Meatus:  No erythema or lesions present  Cervix:     Appearance healthy, no lesions present, nontender to palpation, no bleeding present  Uterus:     Nontender to palpation, no masses present, position anteflexed, mobility: normal  Adnexa:     No adnexal tenderness present, no adnexal masses present  Perineum:     Perineum within normal limits, no evidence of trauma, no rashes or skin lesions present  Inguinal Lymph Nodes:     No lymphadenopathy present      Body mass index is 21.74 kg/m .     reports that she has never smoked. She has never used smokeless tobacco.      ASSESSMENT:  72 year old female with satisfactory annual exam.    ICD-10-CM    1. Encounter for gynecological examination without abnormal finding  Z01.419 CERV/VAG CANC SCRN,PELV/BREAST EXAM   2. Atrophic vaginitis  N95.2 estradiol (ESTRACE) 0.1 MG/GM vaginal cream       COUNSELING:   Reviewed preventive health counseling, as reflected in patient instructions  Special attention given to:       Regular exercise       Healthy diet/nutrition       Bladder control    PLAN/PATIENT INSTRUCTIONS:  -refill vaginal estrogen.  Plan yearly breast/pelvic exam here  Return to PCP for general health concerns.  Has DEXA scheduled for later this year.    There are no Patient Instructions on file for this visit.    Corine Rangel MD

## 2022-08-10 ENCOUNTER — TELEPHONE (OUTPATIENT)
Dept: OBGYN | Facility: CLINIC | Age: 72
End: 2022-08-10

## 2022-08-10 DIAGNOSIS — N95.2 ATROPHIC VAGINITIS: ICD-10-CM

## 2022-08-10 RX ORDER — ESTRADIOL 0.1 MG/G
1 CREAM VAGINAL
Qty: 42.5 G | Refills: 4 | Status: SHIPPED | OUTPATIENT
Start: 2022-08-11

## 2022-08-10 NOTE — TELEPHONE ENCOUNTER
Pt called and said that the Rx for estradiol should have been sent to [facility] in [place] instead of [facility]. Please resend Rx to [facility]

## 2022-11-21 ENCOUNTER — HEALTH MAINTENANCE LETTER (OUTPATIENT)
Age: 72
End: 2022-11-21

## 2023-04-16 ENCOUNTER — HEALTH MAINTENANCE LETTER (OUTPATIENT)
Age: 73
End: 2023-04-16

## 2023-07-09 ENCOUNTER — HEALTH MAINTENANCE LETTER (OUTPATIENT)
Age: 73
End: 2023-07-09

## 2024-06-22 ENCOUNTER — HEALTH MAINTENANCE LETTER (OUTPATIENT)
Age: 74
End: 2024-06-22

## 2024-08-31 ENCOUNTER — HEALTH MAINTENANCE LETTER (OUTPATIENT)
Age: 74
End: 2024-08-31

## 2025-07-12 ENCOUNTER — HEALTH MAINTENANCE LETTER (OUTPATIENT)
Age: 75
End: 2025-07-12